# Patient Record
Sex: MALE | Race: WHITE | NOT HISPANIC OR LATINO | Employment: OTHER | URBAN - METROPOLITAN AREA
[De-identification: names, ages, dates, MRNs, and addresses within clinical notes are randomized per-mention and may not be internally consistent; named-entity substitution may affect disease eponyms.]

---

## 2017-01-01 ENCOUNTER — HOSPITAL ENCOUNTER (INPATIENT)
Facility: HOSPITAL | Age: 82
LOS: 5 days | End: 2017-07-24
Attending: FAMILY MEDICINE | Admitting: FAMILY MEDICINE

## 2017-01-01 ENCOUNTER — APPOINTMENT (OUTPATIENT)
Dept: GENERAL RADIOLOGY | Facility: HOSPITAL | Age: 82
End: 2017-01-01

## 2017-01-01 ENCOUNTER — LAB (OUTPATIENT)
Dept: LAB | Facility: HOSPITAL | Age: 82
End: 2017-01-01

## 2017-01-01 ENCOUNTER — HOSPITAL ENCOUNTER (INPATIENT)
Facility: HOSPITAL | Age: 82
LOS: 5 days | End: 2017-07-19
Attending: EMERGENCY MEDICINE | Admitting: HOSPITALIST

## 2017-01-01 ENCOUNTER — TRANSCRIBE ORDERS (OUTPATIENT)
Dept: LAB | Facility: HOSPITAL | Age: 82
End: 2017-01-01

## 2017-01-01 VITALS
DIASTOLIC BLOOD PRESSURE: 48 MMHG | RESPIRATION RATE: 18 BRPM | HEIGHT: 67 IN | TEMPERATURE: 97.6 F | BODY MASS INDEX: 19.62 KG/M2 | HEART RATE: 63 BPM | SYSTOLIC BLOOD PRESSURE: 100 MMHG | OXYGEN SATURATION: 96 % | WEIGHT: 125 LBS

## 2017-01-01 VITALS
BODY MASS INDEX: 19.62 KG/M2 | OXYGEN SATURATION: 93 % | TEMPERATURE: 97.1 F | HEIGHT: 67 IN | DIASTOLIC BLOOD PRESSURE: 61 MMHG | HEART RATE: 63 BPM | SYSTOLIC BLOOD PRESSURE: 91 MMHG | WEIGHT: 125 LBS | RESPIRATION RATE: 18 BRPM

## 2017-01-01 DIAGNOSIS — Z78.9 IMPAIRED MOBILITY AND ADLS: ICD-10-CM

## 2017-01-01 DIAGNOSIS — Z74.09 IMPAIRED FUNCTIONAL MOBILITY, BALANCE, GAIT, AND ENDURANCE: ICD-10-CM

## 2017-01-01 DIAGNOSIS — G06.1 INTRASPINAL ABSCESS: ICD-10-CM

## 2017-01-01 DIAGNOSIS — I95.9 HYPOTENSION, UNSPECIFIED HYPOTENSION TYPE: ICD-10-CM

## 2017-01-01 DIAGNOSIS — N39.0 ACUTE UTI: Primary | ICD-10-CM

## 2017-01-01 DIAGNOSIS — B95.2 ENTEROCOCCUS FAECALIS INFECTION: ICD-10-CM

## 2017-01-01 DIAGNOSIS — M46.36 PYOGENIC INFECTION OF LUMBAR INTERVERTEBRAL DISC (HCC): ICD-10-CM

## 2017-01-01 DIAGNOSIS — Z74.09 IMPAIRED MOBILITY AND ADLS: ICD-10-CM

## 2017-01-01 DIAGNOSIS — E87.20 LACTIC ACID INCREASED: ICD-10-CM

## 2017-01-01 DIAGNOSIS — I48.91 ATRIAL FIBRILLATION, UNSPECIFIED TYPE (HCC): ICD-10-CM

## 2017-01-01 DIAGNOSIS — F03.90 DEMENTIA WITHOUT BEHAVIORAL DISTURBANCE, UNSPECIFIED DEMENTIA TYPE: ICD-10-CM

## 2017-01-01 DIAGNOSIS — T84.63XD INFLAMMATORY REACTION DUE TO INTERNAL FIXATION DEVICE OF SPINE, SUBSEQUENT ENCOUNTER: Primary | ICD-10-CM

## 2017-01-01 DIAGNOSIS — R41.82 ALTERED MENTAL STATUS, UNSPECIFIED ALTERED MENTAL STATUS TYPE: ICD-10-CM

## 2017-01-01 DIAGNOSIS — T84.63XD INFLAMMATORY REACTION DUE TO INTERNAL FIXATION DEVICE OF SPINE, SUBSEQUENT ENCOUNTER: ICD-10-CM

## 2017-01-01 LAB
ABO + RH BLD: NORMAL
ABO + RH BLD: NORMAL
ABO GROUP BLD: NORMAL
ABO GROUP BLD: NORMAL
ALBUMIN SERPL-MCNC: 2.7 G/DL (ref 3.2–4.8)
ALBUMIN SERPL-MCNC: 3.5 G/DL (ref 3.2–4.8)
ALBUMIN/GLOB SERPL: 1.1 G/DL (ref 1.5–2.5)
ALBUMIN/GLOB SERPL: 1.2 G/DL (ref 1.5–2.5)
ALP SERPL-CCNC: 110 U/L (ref 25–100)
ALP SERPL-CCNC: 88 U/L (ref 25–100)
ALT SERPL W P-5'-P-CCNC: 11 U/L (ref 7–40)
ALT SERPL W P-5'-P-CCNC: 14 U/L (ref 7–40)
ANION GAP SERPL CALCULATED.3IONS-SCNC: 7 MMOL/L (ref 3–11)
ANION GAP SERPL CALCULATED.3IONS-SCNC: 7 MMOL/L (ref 3–11)
ANION GAP SERPL CALCULATED.3IONS-SCNC: 8 MMOL/L (ref 3–11)
ANION GAP SERPL CALCULATED.3IONS-SCNC: 8 MMOL/L (ref 3–11)
AST SERPL-CCNC: 15 U/L (ref 0–33)
AST SERPL-CCNC: 17 U/L (ref 0–33)
BACTERIA SPEC AEROBE CULT: ABNORMAL
BACTERIA SPEC AEROBE CULT: NORMAL
BACTERIA SPEC AEROBE CULT: NORMAL
BACTERIA UR QL AUTO: ABNORMAL /HPF
BASOPHILS # BLD AUTO: 0 10*3/MM3 (ref 0–0.2)
BASOPHILS # BLD AUTO: 0.01 10*3/MM3 (ref 0–0.2)
BASOPHILS NFR BLD AUTO: 0 % (ref 0–1)
BASOPHILS NFR BLD AUTO: 0.2 % (ref 0–1)
BASOPHILS NFR BLD AUTO: 0.3 % (ref 0–1)
BH BB BLOOD EXPIRATION DATE: NORMAL
BH BB BLOOD EXPIRATION DATE: NORMAL
BH BB BLOOD TYPE BARCODE: 7300
BH BB BLOOD TYPE BARCODE: 7300
BH BB DISPENSE STATUS: NORMAL
BH BB DISPENSE STATUS: NORMAL
BH BB PRODUCT CODE: NORMAL
BH BB PRODUCT CODE: NORMAL
BH BB UNIT NUMBER: NORMAL
BH BB UNIT NUMBER: NORMAL
BILIRUB SERPL-MCNC: 0.7 MG/DL (ref 0.3–1.2)
BILIRUB SERPL-MCNC: 0.9 MG/DL (ref 0.3–1.2)
BILIRUB UR QL STRIP: NEGATIVE
BLD GP AB SCN SERPL QL: NEGATIVE
BNP SERPL-MCNC: 323 PG/ML (ref 0–100)
BUN BLD-MCNC: 22 MG/DL (ref 9–23)
BUN BLD-MCNC: 27 MG/DL (ref 9–23)
BUN BLD-MCNC: 27 MG/DL (ref 9–23)
BUN BLD-MCNC: 33 MG/DL (ref 9–23)
BUN/CREAT SERPL: 22 (ref 7–25)
BUN/CREAT SERPL: 22.5 (ref 7–25)
BUN/CREAT SERPL: 24.5 (ref 7–25)
BUN/CREAT SERPL: 25.4 (ref 7–25)
BURR CELLS BLD QL SMEAR: NORMAL
C DIFF TOX GENS STL QL NAA+PROBE: NOT DETECTED
CALCIUM SPEC-SCNC: 10 MG/DL (ref 8.7–10.4)
CALCIUM SPEC-SCNC: 9 MG/DL (ref 8.7–10.4)
CALCIUM SPEC-SCNC: 9.3 MG/DL (ref 8.7–10.4)
CALCIUM SPEC-SCNC: 9.3 MG/DL (ref 8.7–10.4)
CHLORIDE SERPL-SCNC: 105 MMOL/L (ref 99–109)
CHLORIDE SERPL-SCNC: 106 MMOL/L (ref 99–109)
CHLORIDE SERPL-SCNC: 106 MMOL/L (ref 99–109)
CHLORIDE SERPL-SCNC: 109 MMOL/L (ref 99–109)
CK SERPL-CCNC: 22 U/L (ref 26–174)
CLARITY UR: ABNORMAL
CO2 SERPL-SCNC: 21 MMOL/L (ref 20–31)
CO2 SERPL-SCNC: 22 MMOL/L (ref 20–31)
CO2 SERPL-SCNC: 23 MMOL/L (ref 20–31)
CO2 SERPL-SCNC: 24 MMOL/L (ref 20–31)
COLOR UR: YELLOW
CREAT BLD-MCNC: 1 MG/DL (ref 0.6–1.3)
CREAT BLD-MCNC: 1.1 MG/DL (ref 0.6–1.3)
CREAT BLD-MCNC: 1.2 MG/DL (ref 0.6–1.3)
CREAT BLD-MCNC: 1.3 MG/DL (ref 0.6–1.3)
CROSSMATCH INTERPRETATION: NORMAL
CROSSMATCH INTERPRETATION: NORMAL
CRP SERPL-MCNC: 0.61 MG/DL (ref 0–1)
D-LACTATE SERPL-SCNC: 2.4 MMOL/L (ref 0.5–2)
D-LACTATE SERPL-SCNC: 3.5 MMOL/L (ref 0.5–2)
D-LACTATE SERPL-SCNC: 3.7 MMOL/L (ref 0.5–2)
D-LACTATE SERPL-SCNC: 5.1 MMOL/L (ref 0.5–2)
DEPRECATED RDW RBC AUTO: 54.2 FL (ref 37–54)
DEPRECATED RDW RBC AUTO: 57.8 FL (ref 37–54)
DEPRECATED RDW RBC AUTO: 57.9 FL (ref 37–54)
DEPRECATED RDW RBC AUTO: 58.1 FL (ref 37–54)
DEPRECATED RDW RBC AUTO: 58.4 FL (ref 37–54)
EOSINOPHIL # BLD AUTO: 0.02 10*3/MM3 (ref 0–0.3)
EOSINOPHIL # BLD AUTO: 0.05 10*3/MM3 (ref 0–0.3)
EOSINOPHIL # BLD AUTO: 0.06 10*3/MM3 (ref 0–0.3)
EOSINOPHIL # BLD AUTO: 0.07 10*3/MM3 (ref 0–0.3)
EOSINOPHIL # BLD AUTO: 0.09 10*3/MM3 (ref 0–0.3)
EOSINOPHIL NFR BLD AUTO: 0.5 % (ref 0–3)
EOSINOPHIL NFR BLD AUTO: 1.6 % (ref 0–3)
EOSINOPHIL NFR BLD AUTO: 1.7 % (ref 0–3)
EOSINOPHIL NFR BLD AUTO: 1.8 % (ref 0–3)
EOSINOPHIL NFR BLD AUTO: 3.1 % (ref 0–3)
ERYTHROCYTE [DISTWIDTH] IN BLOOD BY AUTOMATED COUNT: 17.4 % (ref 11.3–14.5)
ERYTHROCYTE [DISTWIDTH] IN BLOOD BY AUTOMATED COUNT: 18.6 % (ref 11.3–14.5)
ERYTHROCYTE [DISTWIDTH] IN BLOOD BY AUTOMATED COUNT: 18.7 % (ref 11.3–14.5)
ERYTHROCYTE [DISTWIDTH] IN BLOOD BY AUTOMATED COUNT: 18.8 % (ref 11.3–14.5)
ERYTHROCYTE [DISTWIDTH] IN BLOOD BY AUTOMATED COUNT: 18.8 % (ref 11.3–14.5)
GFR SERPL CREATININE-BSD FRML MDRD: 52 ML/MIN/1.73
GFR SERPL CREATININE-BSD FRML MDRD: 57 ML/MIN/1.73
GFR SERPL CREATININE-BSD FRML MDRD: 63 ML/MIN/1.73
GFR SERPL CREATININE-BSD FRML MDRD: 71 ML/MIN/1.73
GLOBULIN UR ELPH-MCNC: 2.5 GM/DL
GLOBULIN UR ELPH-MCNC: 3 GM/DL
GLUCOSE BLD-MCNC: 107 MG/DL (ref 70–100)
GLUCOSE BLD-MCNC: 76 MG/DL (ref 70–100)
GLUCOSE BLD-MCNC: 78 MG/DL (ref 70–100)
GLUCOSE BLD-MCNC: 80 MG/DL (ref 70–100)
GLUCOSE BLDC GLUCOMTR-MCNC: 69 MG/DL (ref 70–130)
GLUCOSE UR STRIP-MCNC: NEGATIVE MG/DL
HCT VFR BLD AUTO: 23.6 % (ref 38.9–50.9)
HCT VFR BLD AUTO: 25.3 % (ref 38.9–50.9)
HCT VFR BLD AUTO: 26.5 % (ref 38.9–50.9)
HCT VFR BLD AUTO: 29.2 % (ref 38.9–50.9)
HCT VFR BLD AUTO: 29.9 % (ref 38.9–50.9)
HGB BLD-MCNC: 7.4 G/DL (ref 13.1–17.5)
HGB BLD-MCNC: 7.9 G/DL (ref 13.1–17.5)
HGB BLD-MCNC: 8.2 G/DL (ref 13.1–17.5)
HGB BLD-MCNC: 9.2 G/DL (ref 13.1–17.5)
HGB BLD-MCNC: 9.8 G/DL (ref 13.1–17.5)
HGB UR QL STRIP.AUTO: ABNORMAL
HOLD SPECIMEN: NORMAL
HYALINE CASTS UR QL AUTO: ABNORMAL /LPF
IMM GRANULOCYTES # BLD: 0 10*3/MM3 (ref 0–0.03)
IMM GRANULOCYTES # BLD: 0.01 10*3/MM3 (ref 0–0.03)
IMM GRANULOCYTES # BLD: 0.01 10*3/MM3 (ref 0–0.03)
IMM GRANULOCYTES NFR BLD: 0 % (ref 0–0.6)
IMM GRANULOCYTES NFR BLD: 0.2 % (ref 0–0.6)
IMM GRANULOCYTES NFR BLD: 0.3 % (ref 0–0.6)
KETONES UR QL STRIP: NEGATIVE
LEUKOCYTE ESTERASE UR QL STRIP.AUTO: ABNORMAL
LYMPHOCYTES # BLD AUTO: 0.5 10*3/MM3 (ref 0.6–4.8)
LYMPHOCYTES # BLD AUTO: 0.72 10*3/MM3 (ref 0.6–4.8)
LYMPHOCYTES # BLD AUTO: 0.81 10*3/MM3 (ref 0.6–4.8)
LYMPHOCYTES # BLD AUTO: 0.95 10*3/MM3 (ref 0.6–4.8)
LYMPHOCYTES # BLD AUTO: 1.05 10*3/MM3 (ref 0.6–4.8)
LYMPHOCYTES NFR BLD AUTO: 16.2 % (ref 24–44)
LYMPHOCYTES NFR BLD AUTO: 17.9 % (ref 24–44)
LYMPHOCYTES NFR BLD AUTO: 22.8 % (ref 24–44)
LYMPHOCYTES NFR BLD AUTO: 28 % (ref 24–44)
LYMPHOCYTES NFR BLD AUTO: 31 % (ref 24–44)
MCH RBC QN AUTO: 26.6 PG (ref 27–31)
MCH RBC QN AUTO: 26.7 PG (ref 27–31)
MCH RBC QN AUTO: 26.8 PG (ref 27–31)
MCH RBC QN AUTO: 27.1 PG (ref 27–31)
MCH RBC QN AUTO: 28.1 PG (ref 27–31)
MCHC RBC AUTO-ENTMCNC: 30.9 G/DL (ref 32–36)
MCHC RBC AUTO-ENTMCNC: 31.2 G/DL (ref 32–36)
MCHC RBC AUTO-ENTMCNC: 31.4 G/DL (ref 32–36)
MCHC RBC AUTO-ENTMCNC: 31.5 G/DL (ref 32–36)
MCHC RBC AUTO-ENTMCNC: 32.8 G/DL (ref 32–36)
MCV RBC AUTO: 85.2 FL (ref 80–99)
MCV RBC AUTO: 85.7 FL (ref 80–99)
MCV RBC AUTO: 85.8 FL (ref 80–99)
MCV RBC AUTO: 85.9 FL (ref 80–99)
MCV RBC AUTO: 86 FL (ref 80–99)
MONOCYTES # BLD AUTO: 0.15 10*3/MM3 (ref 0–1)
MONOCYTES # BLD AUTO: 0.25 10*3/MM3 (ref 0–1)
MONOCYTES # BLD AUTO: 0.28 10*3/MM3 (ref 0–1)
MONOCYTES # BLD AUTO: 0.29 10*3/MM3 (ref 0–1)
MONOCYTES # BLD AUTO: 0.5 10*3/MM3 (ref 0–1)
MONOCYTES NFR BLD AUTO: 10 % (ref 0–12)
MONOCYTES NFR BLD AUTO: 12 % (ref 0–12)
MONOCYTES NFR BLD AUTO: 4.9 % (ref 0–12)
MONOCYTES NFR BLD AUTO: 6.9 % (ref 0–12)
MONOCYTES NFR BLD AUTO: 7.4 % (ref 0–12)
NEUTROPHILS # BLD AUTO: 1.69 10*3/MM3 (ref 1.5–8.3)
NEUTROPHILS # BLD AUTO: 2.02 10*3/MM3 (ref 1.5–8.3)
NEUTROPHILS # BLD AUTO: 2.37 10*3/MM3 (ref 1.5–8.3)
NEUTROPHILS # BLD AUTO: 2.64 10*3/MM3 (ref 1.5–8.3)
NEUTROPHILS # BLD AUTO: 3 10*3/MM3 (ref 1.5–8.3)
NEUTROPHILS NFR BLD AUTO: 58.6 % (ref 41–71)
NEUTROPHILS NFR BLD AUTO: 59.5 % (ref 41–71)
NEUTROPHILS NFR BLD AUTO: 63.3 % (ref 41–71)
NEUTROPHILS NFR BLD AUTO: 74.5 % (ref 41–71)
NEUTROPHILS NFR BLD AUTO: 76.7 % (ref 41–71)
NITRITE UR QL STRIP: NEGATIVE
NRBC BLD MANUAL-RTO: 0 /100 WBC (ref 0–0)
NRBC BLD MANUAL-RTO: 0 /100 WBC (ref 0–0)
OVALOCYTES BLD QL SMEAR: NORMAL
OVALOCYTES BLD QL SMEAR: NORMAL
PH UR STRIP.AUTO: 5.5 [PH] (ref 5–8)
PLAT MORPH BLD: NORMAL
PLATELET # BLD AUTO: 33 10*3/MM3 (ref 150–450)
PLATELET # BLD AUTO: 33 10*3/MM3 (ref 150–450)
PLATELET # BLD AUTO: 36 10*3/MM3 (ref 150–450)
PLATELET # BLD AUTO: 38 10*3/MM3 (ref 150–450)
PLATELET # BLD AUTO: 54 10*3/MM3 (ref 150–450)
PMV BLD AUTO: 10.3 FL (ref 6–12)
PMV BLD AUTO: 10.5 FL (ref 6–12)
PMV BLD AUTO: 10.6 FL (ref 6–12)
PMV BLD AUTO: 11.1 FL (ref 6–12)
POTASSIUM BLD-SCNC: 3.8 MMOL/L (ref 3.5–5.5)
POTASSIUM BLD-SCNC: 4 MMOL/L (ref 3.5–5.5)
POTASSIUM BLD-SCNC: 4.1 MMOL/L (ref 3.5–5.5)
POTASSIUM BLD-SCNC: 4.2 MMOL/L (ref 3.5–5.5)
PROT SERPL-MCNC: 5.2 G/DL (ref 5.7–8.2)
PROT SERPL-MCNC: 6.5 G/DL (ref 5.7–8.2)
PROT UR QL STRIP: ABNORMAL
RBC # BLD AUTO: 2.77 10*6/MM3 (ref 4.2–5.76)
RBC # BLD AUTO: 2.95 10*6/MM3 (ref 4.2–5.76)
RBC # BLD AUTO: 3.08 10*6/MM3 (ref 4.2–5.76)
RBC # BLD AUTO: 3.4 10*6/MM3 (ref 4.2–5.76)
RBC # BLD AUTO: 3.49 10*6/MM3 (ref 4.2–5.76)
RBC # UR: ABNORMAL /HPF
RBC MORPH BLD: NORMAL
REF LAB TEST METHOD: ABNORMAL
RH BLD: POSITIVE
RH BLD: POSITIVE
SODIUM BLD-SCNC: 135 MMOL/L (ref 132–146)
SODIUM BLD-SCNC: 136 MMOL/L (ref 132–146)
SODIUM BLD-SCNC: 137 MMOL/L (ref 132–146)
SODIUM BLD-SCNC: 138 MMOL/L (ref 132–146)
SP GR UR STRIP: 1.01 (ref 1–1.03)
SQUAMOUS #/AREA URNS HPF: ABNORMAL /HPF
TROPONIN I SERPL-MCNC: 0.01 NG/ML (ref 0–0.07)
UNIT  ABO: NORMAL
UNIT  ABO: NORMAL
UNIT  RH: NORMAL
UNIT  RH: NORMAL
UROBILINOGEN UR QL STRIP: ABNORMAL
WBC MORPH BLD: NORMAL
WBC NRBC COR # BLD: 2.89 10*3/MM3 (ref 3.5–10.8)
WBC NRBC COR # BLD: 3.09 10*3/MM3 (ref 3.5–10.8)
WBC NRBC COR # BLD: 3.39 10*3/MM3 (ref 3.5–10.8)
WBC NRBC COR # BLD: 4.03 10*3/MM3 (ref 3.5–10.8)
WBC NRBC COR # BLD: 4.17 10*3/MM3 (ref 3.5–10.8)
WBC UR QL AUTO: ABNORMAL /HPF
WHOLE BLOOD HOLD SPECIMEN: NORMAL
WHOLE BLOOD HOLD SPECIMEN: NORMAL

## 2017-01-01 PROCEDURE — 25010000002 DAPTOMYCIN PER 1 MG: Performed by: INTERNAL MEDICINE

## 2017-01-01 PROCEDURE — 87077 CULTURE AEROBIC IDENTIFY: CPT | Performed by: EMERGENCY MEDICINE

## 2017-01-01 PROCEDURE — 25010000002 HALOPERIDOL LACTATE PER 5 MG: Performed by: NURSE PRACTITIONER

## 2017-01-01 PROCEDURE — 86850 RBC ANTIBODY SCREEN: CPT | Performed by: INTERNAL MEDICINE

## 2017-01-01 PROCEDURE — 87186 SC STD MICRODIL/AGAR DIL: CPT | Performed by: EMERGENCY MEDICINE

## 2017-01-01 PROCEDURE — 02HV33Z INSERTION OF INFUSION DEVICE INTO SUPERIOR VENA CAVA, PERCUTANEOUS APPROACH: ICD-10-PCS | Performed by: INTERNAL MEDICINE

## 2017-01-01 PROCEDURE — C1894 INTRO/SHEATH, NON-LASER: HCPCS

## 2017-01-01 PROCEDURE — P9016 RBC LEUKOCYTES REDUCED: HCPCS

## 2017-01-01 PROCEDURE — 25010000002 MORPHINE SULFATE (PF) 2 MG/ML SOLUTION: Performed by: NURSE PRACTITIONER

## 2017-01-01 PROCEDURE — 25010000002 HALOPERIDOL LACTATE PER 5 MG: Performed by: FAMILY MEDICINE

## 2017-01-01 PROCEDURE — 71010 HC CHEST PA OR AP: CPT

## 2017-01-01 PROCEDURE — 86900 BLOOD TYPING SEROLOGIC ABO: CPT | Performed by: INTERNAL MEDICINE

## 2017-01-01 PROCEDURE — 99284 EMERGENCY DEPT VISIT MOD MDM: CPT

## 2017-01-01 PROCEDURE — C1751 CATH, INF, PER/CENT/MIDLINE: HCPCS

## 2017-01-01 PROCEDURE — 99233 SBSQ HOSP IP/OBS HIGH 50: CPT | Performed by: INTERNAL MEDICINE

## 2017-01-01 PROCEDURE — 85007 BL SMEAR W/DIFF WBC COUNT: CPT | Performed by: NURSE PRACTITIONER

## 2017-01-01 PROCEDURE — 25010000002 LORAZEPAM PER 2 MG: Performed by: NURSE PRACTITIONER

## 2017-01-01 PROCEDURE — 83880 ASSAY OF NATRIURETIC PEPTIDE: CPT | Performed by: EMERGENCY MEDICINE

## 2017-01-01 PROCEDURE — 25010000002 ENOXAPARIN PER 10 MG: Performed by: NURSE PRACTITIONER

## 2017-01-01 PROCEDURE — 80053 COMPREHEN METABOLIC PANEL: CPT | Performed by: NURSE PRACTITIONER

## 2017-01-01 PROCEDURE — 85025 COMPLETE CBC W/AUTO DIFF WBC: CPT | Performed by: NURSE PRACTITIONER

## 2017-01-01 PROCEDURE — 97530 THERAPEUTIC ACTIVITIES: CPT

## 2017-01-01 PROCEDURE — 83605 ASSAY OF LACTIC ACID: CPT | Performed by: NURSE PRACTITIONER

## 2017-01-01 PROCEDURE — 97110 THERAPEUTIC EXERCISES: CPT

## 2017-01-01 PROCEDURE — 82962 GLUCOSE BLOOD TEST: CPT

## 2017-01-01 PROCEDURE — 86920 COMPATIBILITY TEST SPIN: CPT

## 2017-01-01 PROCEDURE — 81001 URINALYSIS AUTO W/SCOPE: CPT | Performed by: EMERGENCY MEDICINE

## 2017-01-01 PROCEDURE — 80053 COMPREHEN METABOLIC PANEL: CPT | Performed by: EMERGENCY MEDICINE

## 2017-01-01 PROCEDURE — 85007 BL SMEAR W/DIFF WBC COUNT: CPT | Performed by: INTERNAL MEDICINE

## 2017-01-01 PROCEDURE — 85025 COMPLETE CBC W/AUTO DIFF WBC: CPT | Performed by: EMERGENCY MEDICINE

## 2017-01-01 PROCEDURE — 82550 ASSAY OF CK (CPK): CPT | Performed by: INTERNAL MEDICINE

## 2017-01-01 PROCEDURE — 25010000002 MEROPENEM: Performed by: NURSE PRACTITIONER

## 2017-01-01 PROCEDURE — 25010000002 MEROPENEM: Performed by: EMERGENCY MEDICINE

## 2017-01-01 PROCEDURE — 86900 BLOOD TYPING SEROLOGIC ABO: CPT

## 2017-01-01 PROCEDURE — 36430 TRANSFUSION BLD/BLD COMPNT: CPT

## 2017-01-01 PROCEDURE — 99238 HOSP IP/OBS DSCHRG MGMT 30/<: CPT | Performed by: HOSPITALIST

## 2017-01-01 PROCEDURE — 87493 C DIFF AMPLIFIED PROBE: CPT | Performed by: NURSE PRACTITIONER

## 2017-01-01 PROCEDURE — 97162 PT EVAL MOD COMPLEX 30 MIN: CPT

## 2017-01-01 PROCEDURE — 93005 ELECTROCARDIOGRAM TRACING: CPT | Performed by: EMERGENCY MEDICINE

## 2017-01-01 PROCEDURE — 99232 SBSQ HOSP IP/OBS MODERATE 35: CPT | Performed by: NURSE PRACTITIONER

## 2017-01-01 PROCEDURE — 86901 BLOOD TYPING SEROLOGIC RH(D): CPT

## 2017-01-01 PROCEDURE — 99223 1ST HOSP IP/OBS HIGH 75: CPT | Performed by: INTERNAL MEDICINE

## 2017-01-01 PROCEDURE — 87040 BLOOD CULTURE FOR BACTERIA: CPT | Performed by: EMERGENCY MEDICINE

## 2017-01-01 PROCEDURE — 80048 BASIC METABOLIC PNL TOTAL CA: CPT | Performed by: INTERNAL MEDICINE

## 2017-01-01 PROCEDURE — 85025 COMPLETE CBC W/AUTO DIFF WBC: CPT | Performed by: INTERNAL MEDICINE

## 2017-01-01 PROCEDURE — 97166 OT EVAL MOD COMPLEX 45 MIN: CPT

## 2017-01-01 PROCEDURE — 99232 SBSQ HOSP IP/OBS MODERATE 35: CPT | Performed by: INTERNAL MEDICINE

## 2017-01-01 PROCEDURE — 84484 ASSAY OF TROPONIN QUANT: CPT

## 2017-01-01 PROCEDURE — 86140 C-REACTIVE PROTEIN: CPT | Performed by: EMERGENCY MEDICINE

## 2017-01-01 PROCEDURE — 86901 BLOOD TYPING SEROLOGIC RH(D): CPT | Performed by: INTERNAL MEDICINE

## 2017-01-01 PROCEDURE — 83605 ASSAY OF LACTIC ACID: CPT | Performed by: EMERGENCY MEDICINE

## 2017-01-01 PROCEDURE — 87086 URINE CULTURE/COLONY COUNT: CPT | Performed by: EMERGENCY MEDICINE

## 2017-01-01 RX ORDER — LORAZEPAM 2 MG/ML
0.5 INJECTION INTRAMUSCULAR EVERY 4 HOURS PRN
Status: DISCONTINUED | OUTPATIENT
Start: 2017-01-01 | End: 2017-01-01 | Stop reason: HOSPADM

## 2017-01-01 RX ORDER — FINASTERIDE 5 MG/1
5 TABLET, FILM COATED ORAL DAILY
Status: DISCONTINUED | OUTPATIENT
Start: 2017-01-01 | End: 2017-01-01 | Stop reason: HOSPADM

## 2017-01-01 RX ORDER — GLYCOPYRROLATE 0.2 MG/ML
0.2 INJECTION INTRAMUSCULAR; INTRAVENOUS
Status: DISCONTINUED | OUTPATIENT
Start: 2017-01-01 | End: 2017-01-01 | Stop reason: HOSPADM

## 2017-01-01 RX ORDER — MORPHINE SULFATE 2 MG/ML
2 INJECTION, SOLUTION INTRAMUSCULAR; INTRAVENOUS
Status: DISCONTINUED | OUTPATIENT
Start: 2017-01-01 | End: 2017-01-01 | Stop reason: HOSPADM

## 2017-01-01 RX ORDER — HALOPERIDOL 5 MG/ML
0.5 INJECTION INTRAMUSCULAR EVERY 6 HOURS
Status: DISCONTINUED | OUTPATIENT
Start: 2017-01-01 | End: 2017-01-01

## 2017-01-01 RX ORDER — FINASTERIDE 5 MG/1
5 TABLET, FILM COATED ORAL DAILY
COMMUNITY

## 2017-01-01 RX ORDER — DONEPEZIL HYDROCHLORIDE 10 MG/1
5 TABLET, FILM COATED ORAL NIGHTLY
Status: DISCONTINUED | OUTPATIENT
Start: 2017-01-01 | End: 2017-01-01

## 2017-01-01 RX ORDER — SACCHAROMYCES BOULARDII 250 MG
250 CAPSULE ORAL 2 TIMES DAILY
Status: DISCONTINUED | OUTPATIENT
Start: 2017-01-01 | End: 2017-01-01

## 2017-01-01 RX ORDER — GLYCOPYRROLATE 0.2 MG/ML
0.2 INJECTION INTRAMUSCULAR; INTRAVENOUS 2 TIMES DAILY PRN
Status: DISCONTINUED | OUTPATIENT
Start: 2017-01-01 | End: 2017-01-01

## 2017-01-01 RX ORDER — NIFEDIPINE 60 MG/1
60 TABLET, EXTENDED RELEASE ORAL DAILY
COMMUNITY

## 2017-01-01 RX ORDER — DONEPEZIL HYDROCHLORIDE 5 MG/1
5 TABLET, FILM COATED ORAL NIGHTLY
COMMUNITY

## 2017-01-01 RX ORDER — FAMOTIDINE 20 MG/1
20 TABLET, FILM COATED ORAL DAILY
COMMUNITY

## 2017-01-01 RX ORDER — HYDROCODONE BITARTRATE AND ACETAMINOPHEN 5; 325 MG/1; MG/1
1 TABLET ORAL EVERY 8 HOURS PRN
Status: DISCONTINUED | OUTPATIENT
Start: 2017-01-01 | End: 2017-01-01

## 2017-01-01 RX ORDER — HALOPERIDOL 5 MG/ML
2 INJECTION INTRAMUSCULAR EVERY 4 HOURS PRN
Status: DISCONTINUED | OUTPATIENT
Start: 2017-01-01 | End: 2017-01-01 | Stop reason: HOSPADM

## 2017-01-01 RX ORDER — SACCHAROMYCES BOULARDII 250 MG
250 CAPSULE ORAL 2 TIMES DAILY
COMMUNITY

## 2017-01-01 RX ORDER — ASPIRIN 81 MG/1
81 TABLET, CHEWABLE ORAL DAILY
COMMUNITY

## 2017-01-01 RX ORDER — MORPHINE SULFATE 2 MG/ML
1 INJECTION, SOLUTION INTRAMUSCULAR; INTRAVENOUS EVERY 6 HOURS SCHEDULED
Status: DISCONTINUED | OUTPATIENT
Start: 2017-01-01 | End: 2017-01-01 | Stop reason: HOSPADM

## 2017-01-01 RX ORDER — TEMAZEPAM 7.5 MG/1
7.5 CAPSULE ORAL NIGHTLY PRN
Status: DISCONTINUED | OUTPATIENT
Start: 2017-01-01 | End: 2017-01-01 | Stop reason: HOSPADM

## 2017-01-01 RX ORDER — SODIUM CHLORIDE 0.9 % (FLUSH) 0.9 %
1-10 SYRINGE (ML) INJECTION AS NEEDED
Status: DISCONTINUED | OUTPATIENT
Start: 2017-01-01 | End: 2017-01-01 | Stop reason: HOSPADM

## 2017-01-01 RX ORDER — HALOPERIDOL 5 MG/ML
1 INJECTION INTRAMUSCULAR EVERY 6 HOURS
Status: DISCONTINUED | OUTPATIENT
Start: 2017-01-01 | End: 2017-01-01 | Stop reason: HOSPADM

## 2017-01-01 RX ORDER — FAMOTIDINE 20 MG/1
20 TABLET, FILM COATED ORAL DAILY
Status: DISCONTINUED | OUTPATIENT
Start: 2017-01-01 | End: 2017-01-01

## 2017-01-01 RX ORDER — POLYVINYL ALCOHOL 14 MG/ML
2 SOLUTION/ DROPS OPHTHALMIC 2 TIMES DAILY
Status: DISCONTINUED | OUTPATIENT
Start: 2017-01-01 | End: 2017-01-01

## 2017-01-01 RX ORDER — DOCUSATE SODIUM 100 MG/1
100 CAPSULE, LIQUID FILLED ORAL 2 TIMES DAILY
Status: DISCONTINUED | OUTPATIENT
Start: 2017-01-01 | End: 2017-01-01

## 2017-01-01 RX ORDER — SODIUM CHLORIDE 0.9 % (FLUSH) 0.9 %
10-20 SYRINGE (ML) INJECTION AS NEEDED
Status: DISCONTINUED | OUTPATIENT
Start: 2017-01-01 | End: 2017-01-01 | Stop reason: HOSPADM

## 2017-01-01 RX ORDER — TAMSULOSIN HYDROCHLORIDE 0.4 MG/1
0.4 CAPSULE ORAL DAILY
Status: DISCONTINUED | OUTPATIENT
Start: 2017-01-01 | End: 2017-01-01 | Stop reason: HOSPADM

## 2017-01-01 RX ORDER — SODIUM CHLORIDE 9 MG/ML
125 INJECTION, SOLUTION INTRAVENOUS CONTINUOUS
Status: DISCONTINUED | OUTPATIENT
Start: 2017-01-01 | End: 2017-01-01

## 2017-01-01 RX ORDER — ACETAMINOPHEN 325 MG/1
650 TABLET ORAL EVERY 4 HOURS PRN
Status: DISCONTINUED | OUTPATIENT
Start: 2017-01-01 | End: 2017-01-01 | Stop reason: HOSPADM

## 2017-01-01 RX ORDER — MORPHINE SULFATE 2 MG/ML
1 INJECTION, SOLUTION INTRAMUSCULAR; INTRAVENOUS EVERY 6 HOURS
Status: DISCONTINUED | OUTPATIENT
Start: 2017-01-01 | End: 2017-01-01 | Stop reason: HOSPADM

## 2017-01-01 RX ORDER — SODIUM CHLORIDE 9 MG/ML
75 INJECTION, SOLUTION INTRAVENOUS CONTINUOUS
Status: DISCONTINUED | OUTPATIENT
Start: 2017-01-01 | End: 2017-01-01

## 2017-01-01 RX ORDER — ACETAMINOPHEN 325 MG/1
650 TABLET ORAL EVERY 4 HOURS PRN
COMMUNITY

## 2017-01-01 RX ORDER — SODIUM CHLORIDE 9 MG/ML
100 INJECTION, SOLUTION INTRAVENOUS CONTINUOUS
Status: DISCONTINUED | OUTPATIENT
Start: 2017-01-01 | End: 2017-01-01

## 2017-01-01 RX ORDER — HYDROCODONE BITARTRATE AND ACETAMINOPHEN 5; 325 MG/1; MG/1
1 TABLET ORAL EVERY 8 HOURS PRN
COMMUNITY

## 2017-01-01 RX ORDER — POLYVINYL ALCOHOL 14 MG/ML
2 SOLUTION/ DROPS OPHTHALMIC 2 TIMES DAILY PRN
Status: DISCONTINUED | OUTPATIENT
Start: 2017-01-01 | End: 2017-01-01 | Stop reason: HOSPADM

## 2017-01-01 RX ORDER — DOCUSATE SODIUM 100 MG/1
100 CAPSULE, LIQUID FILLED ORAL 2 TIMES DAILY
COMMUNITY

## 2017-01-01 RX ORDER — GLYCOPYRROLATE 0.2 MG/ML
0.4 INJECTION INTRAMUSCULAR; INTRAVENOUS EVERY 4 HOURS PRN
Status: DISCONTINUED | OUTPATIENT
Start: 2017-01-01 | End: 2017-01-01 | Stop reason: HOSPADM

## 2017-01-01 RX ORDER — LEVOTHYROXINE SODIUM 0.07 MG/1
75 TABLET ORAL EVERY MORNING
COMMUNITY

## 2017-01-01 RX ORDER — TEMAZEPAM 7.5 MG/1
7.5 CAPSULE ORAL NIGHTLY
COMMUNITY

## 2017-01-01 RX ORDER — ONDANSETRON 2 MG/ML
4 INJECTION INTRAMUSCULAR; INTRAVENOUS EVERY 6 HOURS PRN
Status: DISCONTINUED | OUTPATIENT
Start: 2017-01-01 | End: 2017-01-01 | Stop reason: HOSPADM

## 2017-01-01 RX ORDER — HYDROCODONE BITARTRATE AND ACETAMINOPHEN 5; 325 MG/1; MG/1
1 TABLET ORAL EVERY 4 HOURS PRN
Status: DISCONTINUED | OUTPATIENT
Start: 2017-01-01 | End: 2017-01-01 | Stop reason: HOSPADM

## 2017-01-01 RX ORDER — LORAZEPAM 2 MG/ML
0.25 INJECTION INTRAMUSCULAR 2 TIMES DAILY
Status: DISCONTINUED | OUTPATIENT
Start: 2017-01-01 | End: 2017-01-01 | Stop reason: HOSPADM

## 2017-01-01 RX ORDER — ASPIRIN 81 MG/1
81 TABLET, CHEWABLE ORAL DAILY
Status: DISCONTINUED | OUTPATIENT
Start: 2017-01-01 | End: 2017-01-01

## 2017-01-01 RX ORDER — LORAZEPAM 2 MG/ML
0.25 INJECTION INTRAMUSCULAR ONCE
Status: COMPLETED | OUTPATIENT
Start: 2017-01-01 | End: 2017-01-01

## 2017-01-01 RX ORDER — HALOPERIDOL 5 MG/ML
0.5 INJECTION INTRAMUSCULAR EVERY 6 HOURS
Status: DISCONTINUED | OUTPATIENT
Start: 2017-01-01 | End: 2017-01-01 | Stop reason: HOSPADM

## 2017-01-01 RX ORDER — LEVOTHYROXINE SODIUM 0.07 MG/1
75 TABLET ORAL EVERY MORNING
Status: DISCONTINUED | OUTPATIENT
Start: 2017-01-01 | End: 2017-01-01

## 2017-01-01 RX ORDER — BISACODYL 10 MG
10 SUPPOSITORY, RECTAL RECTAL DAILY
Status: DISPENSED | OUTPATIENT
Start: 2017-01-01 | End: 2017-01-01

## 2017-01-01 RX ORDER — TAMSULOSIN HYDROCHLORIDE 0.4 MG/1
0.4 CAPSULE ORAL DAILY
COMMUNITY

## 2017-01-01 RX ORDER — LINEZOLID 600 MG/1
600 TABLET, FILM COATED ORAL 2 TIMES DAILY
COMMUNITY
End: 2017-01-01

## 2017-01-01 RX ORDER — POLYETHYLENE GLYCOL 3350 17 G/17G
17 POWDER, FOR SOLUTION ORAL 2 TIMES DAILY
Status: DISCONTINUED | OUTPATIENT
Start: 2017-01-01 | End: 2017-01-01

## 2017-01-01 RX ORDER — POLYETHYLENE GLYCOL 3350 17 G/17G
17 POWDER, FOR SOLUTION ORAL
COMMUNITY

## 2017-01-01 RX ORDER — BISACODYL 10 MG
10 SUPPOSITORY, RECTAL RECTAL DAILY
Status: DISCONTINUED | OUTPATIENT
Start: 2017-01-01 | End: 2017-01-01 | Stop reason: HOSPADM

## 2017-01-01 RX ORDER — LINEZOLID 600 MG/1
600 TABLET, FILM COATED ORAL EVERY 12 HOURS
Status: DISCONTINUED | OUTPATIENT
Start: 2017-01-01 | End: 2017-01-01

## 2017-01-01 RX ADMIN — LINEZOLID 600 MG: 600 TABLET, FILM COATED ORAL at 06:13

## 2017-01-01 RX ADMIN — DONEPEZIL HYDROCHLORIDE 5 MG: 10 TABLET, FILM COATED ORAL at 20:48

## 2017-01-01 RX ADMIN — Medication: at 17:13

## 2017-01-01 RX ADMIN — Medication 250 MG: at 18:41

## 2017-01-01 RX ADMIN — MEROPENEM 1 G: 1 INJECTION, POWDER, FOR SOLUTION INTRAVENOUS at 05:44

## 2017-01-01 RX ADMIN — HALOPERIDOL LACTATE 1 MG: 5 INJECTION, SOLUTION INTRAMUSCULAR at 20:13

## 2017-01-01 RX ADMIN — SODIUM CHLORIDE 125 ML/HR: 9 INJECTION, SOLUTION INTRAVENOUS at 20:38

## 2017-01-01 RX ADMIN — MORPHINE SULFATE 1 MG: 2 INJECTION, SOLUTION INTRAMUSCULAR; INTRAVENOUS at 01:11

## 2017-01-01 RX ADMIN — LORAZEPAM 0.5 MG: 2 INJECTION INTRAMUSCULAR; INTRAVENOUS at 11:59

## 2017-01-01 RX ADMIN — LEVOTHYROXINE SODIUM 75 MCG: 75 TABLET ORAL at 08:51

## 2017-01-01 RX ADMIN — HALOPERIDOL LACTATE 1 MG: 5 INJECTION, SOLUTION INTRAMUSCULAR at 13:16

## 2017-01-01 RX ADMIN — ASPIRIN 81 MG CHEWABLE TABLET 81 MG: 81 TABLET CHEWABLE at 10:10

## 2017-01-01 RX ADMIN — LEVOTHYROXINE SODIUM 75 MCG: 75 TABLET ORAL at 09:11

## 2017-01-01 RX ADMIN — MORPHINE SULFATE 1 MG: 2 INJECTION, SOLUTION INTRAMUSCULAR; INTRAVENOUS at 17:03

## 2017-01-01 RX ADMIN — DONEPEZIL HYDROCHLORIDE 5 MG: 10 TABLET, FILM COATED ORAL at 21:03

## 2017-01-01 RX ADMIN — DAPTOMYCIN 350 MG: 500 INJECTION, POWDER, LYOPHILIZED, FOR SOLUTION INTRAVENOUS at 14:52

## 2017-01-01 RX ADMIN — HALOPERIDOL LACTATE 1 MG: 5 INJECTION, SOLUTION INTRAMUSCULAR at 21:36

## 2017-01-01 RX ADMIN — FAMOTIDINE 20 MG: 20 TABLET ORAL at 09:14

## 2017-01-01 RX ADMIN — LEVOTHYROXINE SODIUM 75 MCG: 75 TABLET ORAL at 08:55

## 2017-01-01 RX ADMIN — Medication 250 MG: at 08:51

## 2017-01-01 RX ADMIN — FINASTERIDE 5 MG: 5 TABLET, FILM COATED ORAL at 08:52

## 2017-01-01 RX ADMIN — Medication: at 09:51

## 2017-01-01 RX ADMIN — MORPHINE SULFATE 1 MG: 2 INJECTION, SOLUTION INTRAMUSCULAR; INTRAVENOUS at 17:13

## 2017-01-01 RX ADMIN — LORAZEPAM 0.5 MG: 2 INJECTION INTRAMUSCULAR; INTRAVENOUS at 21:47

## 2017-01-01 RX ADMIN — LORAZEPAM 0.25 MG: 2 INJECTION INTRAMUSCULAR; INTRAVENOUS at 12:25

## 2017-01-01 RX ADMIN — LORAZEPAM 0.25 MG: 2 INJECTION INTRAMUSCULAR; INTRAVENOUS at 22:07

## 2017-01-01 RX ADMIN — Medication 250 MG: at 18:48

## 2017-01-01 RX ADMIN — MORPHINE SULFATE 1 MG: 2 INJECTION, SOLUTION INTRAMUSCULAR; INTRAVENOUS at 00:51

## 2017-01-01 RX ADMIN — HALOPERIDOL LACTATE 0.5 MG: 5 INJECTION, SOLUTION INTRAMUSCULAR at 14:22

## 2017-01-01 RX ADMIN — MORPHINE SULFATE 1 MG: 2 INJECTION, SOLUTION INTRAMUSCULAR; INTRAVENOUS at 11:28

## 2017-01-01 RX ADMIN — HALOPERIDOL LACTATE 1 MG: 5 INJECTION, SOLUTION INTRAMUSCULAR at 20:14

## 2017-01-01 RX ADMIN — BISACODYL 10 MG: 10 SUPPOSITORY RECTAL at 17:04

## 2017-01-01 RX ADMIN — LORAZEPAM 0.25 MG: 2 INJECTION INTRAMUSCULAR; INTRAVENOUS at 21:37

## 2017-01-01 RX ADMIN — ENOXAPARIN SODIUM 40 MG: 40 INJECTION SUBCUTANEOUS at 09:10

## 2017-01-01 RX ADMIN — ASPIRIN 81 MG CHEWABLE TABLET 81 MG: 81 TABLET CHEWABLE at 08:56

## 2017-01-01 RX ADMIN — MORPHINE SULFATE 1 MG: 2 INJECTION, SOLUTION INTRAMUSCULAR; INTRAVENOUS at 23:55

## 2017-01-01 RX ADMIN — POLYETHYLENE GLYCOL 3350 17 G: 17 POWDER, FOR SOLUTION ORAL at 18:41

## 2017-01-01 RX ADMIN — MORPHINE SULFATE 1 MG: 2 INJECTION, SOLUTION INTRAMUSCULAR; INTRAVENOUS at 05:19

## 2017-01-01 RX ADMIN — TAMSULOSIN HYDROCHLORIDE 0.4 MG: 0.4 CAPSULE ORAL at 08:48

## 2017-01-01 RX ADMIN — LORAZEPAM 0.25 MG: 2 INJECTION INTRAMUSCULAR; INTRAVENOUS at 08:56

## 2017-01-01 RX ADMIN — MORPHINE SULFATE 1 MG: 2 INJECTION, SOLUTION INTRAMUSCULAR; INTRAVENOUS at 06:37

## 2017-01-01 RX ADMIN — DAPTOMYCIN 350 MG: 500 INJECTION, POWDER, LYOPHILIZED, FOR SOLUTION INTRAVENOUS at 15:44

## 2017-01-01 RX ADMIN — HALOPERIDOL LACTATE 1 MG: 5 INJECTION, SOLUTION INTRAMUSCULAR at 21:04

## 2017-01-01 RX ADMIN — MORPHINE SULFATE 1 MG: 2 INJECTION, SOLUTION INTRAMUSCULAR; INTRAVENOUS at 01:37

## 2017-01-01 RX ADMIN — HALOPERIDOL LACTATE 1 MG: 5 INJECTION, SOLUTION INTRAMUSCULAR at 03:06

## 2017-01-01 RX ADMIN — HALOPERIDOL LACTATE 1 MG: 5 INJECTION, SOLUTION INTRAMUSCULAR at 08:43

## 2017-01-01 RX ADMIN — DAPTOMYCIN 350 MG: 500 INJECTION, POWDER, LYOPHILIZED, FOR SOLUTION INTRAVENOUS at 16:23

## 2017-01-01 RX ADMIN — SODIUM CHLORIDE 1701 ML: 9 INJECTION, SOLUTION INTRAVENOUS at 17:15

## 2017-01-01 RX ADMIN — MORPHINE SULFATE 1 MG: 2 INJECTION, SOLUTION INTRAMUSCULAR; INTRAVENOUS at 17:40

## 2017-01-01 RX ADMIN — HALOPERIDOL LACTATE 1 MG: 5 INJECTION, SOLUTION INTRAMUSCULAR at 08:31

## 2017-01-01 RX ADMIN — HALOPERIDOL LACTATE 1 MG: 5 INJECTION, SOLUTION INTRAMUSCULAR at 01:53

## 2017-01-01 RX ADMIN — MICONAZOLE NITRATE: 2 CREAM TOPICAL at 21:28

## 2017-01-01 RX ADMIN — Medication 250 MG: at 17:11

## 2017-01-01 RX ADMIN — MORPHINE SULFATE 1 MG: 2 INJECTION, SOLUTION INTRAMUSCULAR; INTRAVENOUS at 12:24

## 2017-01-01 RX ADMIN — ERTAPENEM SODIUM 1 G: 1 INJECTION, POWDER, LYOPHILIZED, FOR SOLUTION INTRAMUSCULAR; INTRAVENOUS at 18:47

## 2017-01-01 RX ADMIN — LINEZOLID 600 MG: 600 TABLET, FILM COATED ORAL at 18:41

## 2017-01-01 RX ADMIN — DONEPEZIL HYDROCHLORIDE 5 MG: 10 TABLET, FILM COATED ORAL at 22:01

## 2017-01-01 RX ADMIN — LORAZEPAM 0.5 MG: 2 INJECTION INTRAMUSCULAR; INTRAVENOUS at 21:48

## 2017-01-01 RX ADMIN — HALOPERIDOL LACTATE 0.5 MG: 5 INJECTION, SOLUTION INTRAMUSCULAR at 20:06

## 2017-01-01 RX ADMIN — Medication 250 MG: at 09:11

## 2017-01-01 RX ADMIN — MORPHINE SULFATE 1 MG: 2 INJECTION, SOLUTION INTRAMUSCULAR; INTRAVENOUS at 23:33

## 2017-01-01 RX ADMIN — MORPHINE SULFATE 1 MG: 2 INJECTION, SOLUTION INTRAMUSCULAR; INTRAVENOUS at 17:32

## 2017-01-01 RX ADMIN — FAMOTIDINE 20 MG: 20 TABLET ORAL at 08:56

## 2017-01-01 RX ADMIN — SODIUM CHLORIDE 75 ML/HR: 9 INJECTION, SOLUTION INTRAVENOUS at 21:04

## 2017-01-01 RX ADMIN — HALOPERIDOL LACTATE 0.5 MG: 5 INJECTION, SOLUTION INTRAMUSCULAR at 23:16

## 2017-01-01 RX ADMIN — Medication 250 MG: at 10:10

## 2017-01-01 RX ADMIN — FINASTERIDE 5 MG: 5 TABLET, FILM COATED ORAL at 08:56

## 2017-01-01 RX ADMIN — TAMSULOSIN HYDROCHLORIDE 0.4 MG: 0.4 CAPSULE ORAL at 10:10

## 2017-01-01 RX ADMIN — ENOXAPARIN SODIUM 40 MG: 40 INJECTION SUBCUTANEOUS at 08:51

## 2017-01-01 RX ADMIN — TEMAZEPAM 7.5 MG: 7.5 CAPSULE ORAL at 22:01

## 2017-01-01 RX ADMIN — ASPIRIN 81 MG CHEWABLE TABLET 81 MG: 81 TABLET CHEWABLE at 08:51

## 2017-01-01 RX ADMIN — MICONAZOLE NITRATE: 2 CREAM TOPICAL at 08:52

## 2017-01-01 RX ADMIN — HYDROCODONE BITARTRATE AND ACETAMINOPHEN 1 TABLET: 5; 325 TABLET ORAL at 22:01

## 2017-01-01 RX ADMIN — LORAZEPAM 0.25 MG: 2 INJECTION INTRAMUSCULAR; INTRAVENOUS at 21:04

## 2017-01-01 RX ADMIN — BISACODYL 10 MG: 10 SUPPOSITORY RECTAL at 20:19

## 2017-01-01 RX ADMIN — LORAZEPAM 0.5 MG: 2 INJECTION INTRAMUSCULAR; INTRAVENOUS at 12:50

## 2017-01-01 RX ADMIN — HYDROCODONE BITARTRATE AND ACETAMINOPHEN 1 TABLET: 5; 325 TABLET ORAL at 20:48

## 2017-01-01 RX ADMIN — POLYVINYL ALCOHOL 2 DROP: 14 SOLUTION/ DROPS OPHTHALMIC at 08:43

## 2017-01-01 RX ADMIN — MEROPENEM 1 G: 1 INJECTION, POWDER, FOR SOLUTION INTRAVENOUS at 16:05

## 2017-01-01 RX ADMIN — MICONAZOLE NITRATE: 2 CREAM TOPICAL at 14:04

## 2017-01-01 RX ADMIN — MORPHINE SULFATE 1 MG: 2 INJECTION, SOLUTION INTRAMUSCULAR; INTRAVENOUS at 06:12

## 2017-01-01 RX ADMIN — DONEPEZIL HYDROCHLORIDE 5 MG: 10 TABLET, FILM COATED ORAL at 21:28

## 2017-01-01 RX ADMIN — FINASTERIDE 5 MG: 5 TABLET, FILM COATED ORAL at 09:11

## 2017-01-01 RX ADMIN — HALOPERIDOL LACTATE 0.5 MG: 5 INJECTION, SOLUTION INTRAMUSCULAR at 02:44

## 2017-01-01 RX ADMIN — LORAZEPAM 0.25 MG: 2 INJECTION INTRAMUSCULAR; INTRAVENOUS at 15:43

## 2017-01-01 RX ADMIN — MORPHINE SULFATE 1 MG: 2 INJECTION, SOLUTION INTRAMUSCULAR; INTRAVENOUS at 06:08

## 2017-01-01 RX ADMIN — Medication: at 17:40

## 2017-01-01 RX ADMIN — LORAZEPAM 0.25 MG: 2 INJECTION INTRAMUSCULAR; INTRAVENOUS at 12:23

## 2017-01-01 RX ADMIN — ASPIRIN 81 MG CHEWABLE TABLET 81 MG: 81 TABLET CHEWABLE at 09:14

## 2017-01-01 RX ADMIN — MORPHINE SULFATE 1 MG: 2 INJECTION, SOLUTION INTRAMUSCULAR; INTRAVENOUS at 08:48

## 2017-01-01 RX ADMIN — FINASTERIDE 5 MG: 5 TABLET, FILM COATED ORAL at 08:48

## 2017-01-01 RX ADMIN — LEVOTHYROXINE SODIUM 75 MCG: 75 TABLET ORAL at 06:15

## 2017-01-01 RX ADMIN — MEROPENEM 1 G: 1 INJECTION, POWDER, FOR SOLUTION INTRAVENOUS at 18:31

## 2017-01-01 RX ADMIN — HALOPERIDOL LACTATE 1 MG: 5 INJECTION, SOLUTION INTRAMUSCULAR at 08:56

## 2017-01-01 RX ADMIN — POLYETHYLENE GLYCOL 3350 17 G: 17 POWDER, FOR SOLUTION ORAL at 10:11

## 2017-01-01 RX ADMIN — DAPTOMYCIN 350 MG: 500 INJECTION, POWDER, LYOPHILIZED, FOR SOLUTION INTRAVENOUS at 14:04

## 2017-01-01 RX ADMIN — TAMSULOSIN HYDROCHLORIDE 0.4 MG: 0.4 CAPSULE ORAL at 09:11

## 2017-01-01 RX ADMIN — FINASTERIDE 5 MG: 5 TABLET, FILM COATED ORAL at 10:10

## 2017-01-01 RX ADMIN — BISACODYL 10 MG: 10 SUPPOSITORY RECTAL at 08:55

## 2017-01-01 RX ADMIN — FAMOTIDINE 20 MG: 20 TABLET ORAL at 08:52

## 2017-01-01 RX ADMIN — HYDROCODONE BITARTRATE AND ACETAMINOPHEN 1 TABLET: 5; 325 TABLET ORAL at 21:02

## 2017-01-01 RX ADMIN — SODIUM CHLORIDE 100 ML/HR: 9 INJECTION, SOLUTION INTRAVENOUS at 18:41

## 2017-01-01 RX ADMIN — Medication 250 MG: at 18:31

## 2017-01-01 RX ADMIN — HALOPERIDOL LACTATE 1 MG: 5 INJECTION, SOLUTION INTRAMUSCULAR at 14:27

## 2017-01-01 RX ADMIN — TEMAZEPAM 7.5 MG: 7.5 CAPSULE ORAL at 21:03

## 2017-01-01 RX ADMIN — MORPHINE SULFATE 1 MG: 2 INJECTION, SOLUTION INTRAMUSCULAR; INTRAVENOUS at 06:48

## 2017-01-01 RX ADMIN — SODIUM CHLORIDE 100 ML/HR: 9 INJECTION, SOLUTION INTRAVENOUS at 08:29

## 2017-01-01 RX ADMIN — HALOPERIDOL LACTATE 0.5 MG: 5 INJECTION, SOLUTION INTRAMUSCULAR at 09:51

## 2017-01-01 RX ADMIN — MORPHINE SULFATE 1 MG: 2 INJECTION, SOLUTION INTRAMUSCULAR; INTRAVENOUS at 12:07

## 2017-01-01 RX ADMIN — SODIUM CHLORIDE 1000 ML: 9 INJECTION, SOLUTION INTRAVENOUS at 15:47

## 2017-01-01 RX ADMIN — FAMOTIDINE 20 MG: 20 TABLET ORAL at 10:10

## 2017-01-01 RX ADMIN — TEMAZEPAM 7.5 MG: 7.5 CAPSULE ORAL at 21:36

## 2017-01-01 RX ADMIN — MICONAZOLE NITRATE: 2 CREAM TOPICAL at 21:03

## 2017-01-01 RX ADMIN — TEMAZEPAM 7.5 MG: 7.5 CAPSULE ORAL at 20:48

## 2017-01-01 RX ADMIN — HALOPERIDOL LACTATE 1 MG: 5 INJECTION, SOLUTION INTRAMUSCULAR at 01:11

## 2017-01-01 RX ADMIN — HALOPERIDOL LACTATE 0.5 MG: 5 INJECTION, SOLUTION INTRAMUSCULAR at 05:38

## 2017-01-01 RX ADMIN — ERTAPENEM SODIUM 1 G: 1 INJECTION, POWDER, LYOPHILIZED, FOR SOLUTION INTRAMUSCULAR; INTRAVENOUS at 17:11

## 2017-01-01 RX ADMIN — ENOXAPARIN SODIUM 40 MG: 40 INJECTION SUBCUTANEOUS at 10:10

## 2017-01-01 RX ADMIN — MICONAZOLE NITRATE: 2 CREAM TOPICAL at 09:10

## 2017-01-01 RX ADMIN — MEROPENEM 1 G: 1 INJECTION, POWDER, FOR SOLUTION INTRAVENOUS at 06:12

## 2017-01-01 RX ADMIN — MORPHINE SULFATE 1 MG: 2 INJECTION, SOLUTION INTRAMUSCULAR; INTRAVENOUS at 17:05

## 2017-01-01 RX ADMIN — TAMSULOSIN HYDROCHLORIDE 0.4 MG: 0.4 CAPSULE ORAL at 08:52

## 2017-01-01 RX ADMIN — HALOPERIDOL LACTATE 1 MG: 5 INJECTION, SOLUTION INTRAMUSCULAR at 14:12

## 2017-01-01 RX ADMIN — HALOPERIDOL LACTATE 0.5 MG: 5 INJECTION, SOLUTION INTRAMUSCULAR at 18:45

## 2017-01-01 RX ADMIN — HALOPERIDOL LACTATE 1 MG: 5 INJECTION, SOLUTION INTRAMUSCULAR at 08:22

## 2017-01-01 RX ADMIN — Medication 250 MG: at 08:56

## 2017-01-01 RX ADMIN — MORPHINE SULFATE 1 MG: 2 INJECTION, SOLUTION INTRAMUSCULAR; INTRAVENOUS at 11:59

## 2017-01-01 RX ADMIN — TAMSULOSIN HYDROCHLORIDE 0.4 MG: 0.4 CAPSULE ORAL at 08:56

## 2017-01-01 RX ADMIN — DOCUSATE SODIUM 100 MG: 100 CAPSULE, LIQUID FILLED ORAL at 18:41

## 2017-01-01 RX ADMIN — MORPHINE SULFATE 1 MG: 2 INJECTION, SOLUTION INTRAMUSCULAR; INTRAVENOUS at 19:48

## 2017-01-01 RX ADMIN — HALOPERIDOL LACTATE 1 MG: 5 INJECTION, SOLUTION INTRAMUSCULAR at 15:09

## 2017-01-01 RX ADMIN — DOCUSATE SODIUM 100 MG: 100 CAPSULE, LIQUID FILLED ORAL at 10:10

## 2017-01-01 RX ADMIN — SODIUM CHLORIDE 1000 ML: 9 INJECTION, SOLUTION INTRAVENOUS at 13:38

## 2017-07-14 PROBLEM — E03.9 HYPOTHYROID: Status: ACTIVE | Noted: 2017-01-01

## 2017-07-14 PROBLEM — N39.0 UTI (URINARY TRACT INFECTION): Status: ACTIVE | Noted: 2017-01-01

## 2017-07-14 PROBLEM — N39.0 ACUTE UTI: Status: ACTIVE | Noted: 2017-01-01

## 2017-07-15 PROBLEM — R78.81 ENTEROCOCCAL BACTEREMIA: Status: ACTIVE | Noted: 2017-01-01

## 2017-07-15 PROBLEM — D61.818 PANCYTOPENIA (HCC): Status: ACTIVE | Noted: 2017-01-01

## 2017-07-15 PROBLEM — B95.2 ENTEROCOCCAL BACTEREMIA: Status: ACTIVE | Noted: 2017-01-01

## 2017-07-19 PROBLEM — E87.20 LACTIC ACIDOSIS: Status: ACTIVE | Noted: 2017-01-01

## 2017-07-19 NOTE — H&P
"Hospice History and Physical     Patient Name:  Nestor Laguerre   : 10/1/1929   Sex: male    Patient Care Team:  Braydon Tejeda MD as PCP - General (Family Medicine)    Code Status: comfort measures    Subjective     Patient is a 87 y.o. male who presented with PMHx significant for coronary artery disease, atrial fibrillation, and dementia. Patient also had prostate surgery, which was complicated by enterococcal bacteremia, discitis, and epidural abscess. Patient recently diagnosed with discitis and followed by Infectious Disease. Upon presentation in the Emergency Department, patient had UTI, significant pancytopenia, and lactic acidemia. Pt admitted to hospital 17.     During hospitalization pt received IV abx, blood transfusion, was restless/agitated, poor po intake, and unfortunately showed no clinically improvement. Family opted for comfort measures and pt was admitted to in Hospice on 17.     Review of Systems  Review of Systems   Unable to perform ROS: Patient nonverbal     History  Past Medical History:   Diagnosis Date   • Acute kidney injury    • Alzheimer's dementia    • Atrial fibrillation    • Blood infection 2017   • BPH (benign prostatic hyperplasia)    • Coronary artery disease    • Dementia    • Discitis    • Hypothyroid    • Rheumatic disease     heart     Past Surgical History:   Procedure Laterality Date   • BACK SURGERY  2017    Osbaldo Garcia MD   • CHOLECYSTECTOMY     • CORONARY ANGIOPLASTY WITH STENT PLACEMENT     • HERNIA REPAIR     • PROSTATE SURGERY      \"bored out\" his prostate so he could urinate Dr. Galindo   • TOTAL HIP ARTHROPLASTY Right      Current Facility-Administered Medications   Medication Dose Route Frequency Provider Last Rate Last Dose   • glycopyrrolate (ROBINUL) injection 0.2 mg  0.2 mg Intravenous BID PRN MOE Gutierrez       • haloperidol lactate (HALDOL) injection 0.5 mg  0.5 mg Intravenous Q6H MOE Gutierrez   0.5 mg at 17 1422 "   • LORazepam (ATIVAN) injection 0.5 mg  0.5 mg Intravenous Q4H PRN Violetta Clement, APRN   0.5 mg at 07/19/17 1250   • Morphine sulfate (PF) injection 1 mg  1 mg Intravenous Q6H Violetta Clement, APRN   1 mg at 07/19/17 1207   • Morphine sulfate (PF) injection 2 mg  2 mg Intravenous Q1H PRN Violetta Clement, APRN       • ondansetron (ZOFRAN) injection 4 mg  4 mg Intravenous Q6H PRN Violetta Clement, APRN       • palliative care oral rinse   Mouth/Throat BID Violetta Clement, APRN            •  glycopyrrolate  •  LORazepam  •  Morphine  •  ondansetron  Allergies   Allergen Reactions   • Penicillins Hives and Swelling     Family History   Problem Relation Age of Onset   • Heart disease Mother    • Heart disease Father      Social History     Social History   • Marital status: Unknown     Spouse name: N/A   • Number of children: N/A   • Years of education: N/A     Occupational History   • Not on file.     Social History Main Topics   • Smoking status: Never Smoker   • Smokeless tobacco: Not on file      Comment: tobacco chewer   • Alcohol use No   • Drug use: No   • Sexual activity: Defer     Other Topics Concern   • Not on file     Social History Narrative    Has been at [facility] since 6/27 for rehab.  Lives with his daughter normally.         Objective     Vital Signs  Temp:  [97.6 °F (36.4 °C)] 97.6 °F (36.4 °C)  Heart Rate:  [63-74] 63  Resp:  [18] 18  BP: (100)/(48) 100/48    PPS: Palliative Performance Scale score as of 7/19/2017, 2:49 PM is 20% based on the following measures:   Ambulation: Totally bed bound  Activity and Evidence of Disease: Unable to do any work, extensive evidence of disease  Self-Care: Total care  Intake:Minimal sips  LOC: Full, drowsy or confusion    Physical Exam:  Physical Exam   Constitutional: He appears listless. He appears cachectic. He has a sickly appearance. He appears ill. No distress.   HENT:   + temporal wasting   Eyes:   Eyes remained closed   Neck: No tracheal deviation  present.   Cardiovascular: Normal rate.  Exam reveals no friction rub.    No murmur heard.  Pulmonary/Chest: Effort normal. No respiratory distress. He has no wheezes.   Abdominal: Soft. He exhibits no distension. There is no tenderness.   Musculoskeletal: He exhibits no edema or deformity.   + muscle wasting   Neurological: He appears listless.   Does not follow commands   Skin: He is not diaphoretic. There is pallor.   Psychiatric:   Unable to evaluate       Results Review:   No results found for: HGBA1C    Lab Results   Component Value Date    GLUCOSE 76 07/17/2017    BUN 27 (H) 07/17/2017    CREATININE 1.20 07/17/2017    EGFRIFNONA 57 (L) 07/17/2017    BCR 22.5 07/17/2017    K 4.2 07/17/2017    CO2 21.0 07/17/2017    CALCIUM 9.3 07/17/2017    ALBUMIN 2.70 (L) 07/15/2017    LABIL2 1.1 (L) 07/15/2017    AST 15 07/15/2017    ALT 11 07/15/2017       WBC   Date Value Ref Range Status   07/18/2017 4.17 3.50 - 10.80 10*3/mm3 Final     RBC   Date Value Ref Range Status   07/18/2017 3.49 (L) 4.20 - 5.76 10*6/mm3 Final     Hemoglobin   Date Value Ref Range Status   07/18/2017 9.8 (L) 13.1 - 17.5 g/dL Final     Hematocrit   Date Value Ref Range Status   07/18/2017 29.9 (L) 38.9 - 50.9 % Final     MCV   Date Value Ref Range Status   07/18/2017 85.7 80.0 - 99.0 fL Final     MCH   Date Value Ref Range Status   07/18/2017 28.1 27.0 - 31.0 pg Final     MCHC   Date Value Ref Range Status   07/18/2017 32.8 32.0 - 36.0 g/dL Final     RDW   Date Value Ref Range Status   07/18/2017 17.4 (H) 11.3 - 14.5 % Final     RDW-SD   Date Value Ref Range Status   07/18/2017 54.2 (H) 37.0 - 54.0 fl Final     MPV   Date Value Ref Range Status   07/17/2017 10.6 6.0 - 12.0 fL Final     Platelets   Date Value Ref Range Status   07/18/2017 33 (L) 150 - 450 10*3/mm3 Final     Neutrophil %   Date Value Ref Range Status   07/18/2017 63.3 41.0 - 71.0 % Final     Lymphocyte %   Date Value Ref Range Status   07/18/2017 22.8 (L) 24.0 - 44.0 % Final      Monocyte %   Date Value Ref Range Status   07/18/2017 12.0 0.0 - 12.0 % Final     Eosinophil %   Date Value Ref Range Status   07/18/2017 1.7 0.0 - 3.0 % Final     Basophil %   Date Value Ref Range Status   07/18/2017 0.2 0.0 - 1.0 % Final     Immature Grans %   Date Value Ref Range Status   07/18/2017 0.0 0.0 - 0.6 % Final     Neutrophils, Absolute   Date Value Ref Range Status   07/18/2017 2.64 1.50 - 8.30 10*3/mm3 Final     Lymphocytes, Absolute   Date Value Ref Range Status   07/18/2017 0.95 0.60 - 4.80 10*3/mm3 Final     Monocytes, Absolute   Date Value Ref Range Status   07/18/2017 0.50 0.00 - 1.00 10*3/mm3 Final     Eosinophils, Absolute   Date Value Ref Range Status   07/18/2017 0.07 0.00 - 0.30 10*3/mm3 Final     Basophils, Absolute   Date Value Ref Range Status   07/18/2017 0.01 0.00 - 0.20 10*3/mm3 Final     Immature Grans, Absolute   Date Value Ref Range Status   07/18/2017 0.00 0.00 - 0.03 10*3/mm3 Final     nRBC   Date Value Ref Range Status   07/18/2017 0.0 0.0 - 0.0 /100 WBC Final       Principal Problem:    Lactic acidosis  Active Problems:     recent 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 Discussion about feeding pt at this time, advised decisions to be pt driven. Pt is not asking for food/water. Family comfort feeding, pt regurgitated bite(s) of food this morning per family. Will provide swabs to moisten mouth. Questions answered.     - continue scheduled haldol, morphine  - schedule palliative mouth rinse, eye gtts  - prns available  - continue to monitor and support pt/family    Total Visit Time: 55 minutes  Face to Face Time: 30 minutes    Justification for care:  Patient meets criteria for acute  in-patient care with required nursing assessment and interventions for symptoms with IV medications.    MOE Gutierrez  (C) [phone]  (O) [phone]  07/19/17  2:48 PM

## 2017-07-19 NOTE — PROGRESS NOTES
Adult Nutrition  Assessment/PES    Patient Name:  Nestor Laguerre  YOB: 1929  MRN: [id number]  Admit Date:  7/19/2017    Assessment Date:  7/19/2017        Reason for Assessment       07/19/17 1227    Reason for Assessment    Reason For Assessment/Visit follow up protocol    Time Spent (min) 20    Diagnosis Diagnosis   per notes this admission              Labs/Tests/Procedures/Meds       07/19/17 1230    Labs/Tests/Procedures/Meds    Labs/Tests Review Reviewed              Nutrition Prescription Ordered       07/19/17 1232    Nutrition Prescription PO    Current PO Diet --   no current diet order        Problem/Interventions:        Problem 2       07/19/17 1232    Nutrition Diagnoses Problem 2    Problem 2 No Nutrition Diagnosis at this Time              Intervention Goal       07/19/17 1233    Intervention Goal    General Hospice Care   patient admitted to inpatient hospice            Nutrition Intervention       07/19/17 1233    Nutrition Intervention    RD/Tech Action Follow Tx progress;Care plan reviewd              Education/Evaluation       07/19/17 1233    Monitor/Evaluation    Monitor Per protocol          Electronically signed by:  Olga Barrera  07/19/17 12:33 PM

## 2017-07-19 NOTE — PLAN OF CARE
Problem: Confusion, Acute (Adult)  Goal: Cognitive/Functional Impairments Minimized  Outcome: Ongoing (interventions implemented as appropriate)  Goal: Safety  Outcome: Ongoing (interventions implemented as appropriate)

## 2017-07-19 NOTE — PROGRESS NOTES
Continued Stay Note  [facility]     Patient Name: Nestor Laguerre  MRN: [id number]  Today's Date: 7/19/2017    Admit Date: 7/19/2017          Discharge Plan       07/19/17 1028    Case Management/Social Work Plan    Plan inpatient hospice admission    Additional Comments Chart reviewed.  Met with family to discuss inpatient hospice services. Co visit with hospice SW made.  Instructed on symptom management, inpatient hospice services, team member roles and routines, and contact numbers given to family and facility staff.  Dr. Delgadillo notified of transfer of services.  Patient is resting comfortably in bed.  Breathing is regular, even, and unlabored.  Face and body are relaxed.  Please call [phone] for assistance/concerns.              Discharge Codes     None            Jodie Weaver RN

## 2017-07-20 NOTE — PROGRESS NOTES
"Hospice Progress Note    Patient Name: Nestor Laguerre   : 10/1/1929  Sex: male    Code Status: comfort measures    Date of Admission: 2017    Subjective:    Pt laying in bed, talking today telling stories from \"way back\"    Pt does not answer questions    Family at bedside    No po intake, mouth moistened by sponges      ROS:  Review of Systems   Unable to perform ROS: Acuity of condition     Reviewed current scheduled and prn medications for route, type, dose and frequency.     •  glycopyrrolate  •  haloperidol lactate  •  LORazepam  •  Morphine  •  ondansetron    Objective:   /70 (BP Location: Left arm, Patient Position: Lying)  Pulse 63  Temp 97 °F (36.1 °C) (Axillary)   Resp 18  Ht 67\" (170.2 cm)  Wt 125 lb (56.7 kg)  SpO2 96%  BMI 19.58 kg/m2   Intake & Output (last day)       701 -  0700  07 -  0700          Unmeasured Urine Occurrence 4 x         Lab Results (last 24 hours)     ** No results found for the last 24 hours. **        Imaging Results (last 24 hours)     ** No results found for the last 24 hours. **          PPS: Palliative Performance Scale score as of 2017, 11:41 AM is 10% based on the following measures:   Ambulation: Totally bed bound  Activity and Evidence of Disease: Unable to do any work, extensive evidence of disease  Self-Care: Total care  Intake:  Mouth care only  LOC: Drowsy or coma      Physical Exam:  Physical Exam   Constitutional: No distress.   HENT:   + temporal wasting   Eyes:   Kept eyes closed   Neck: No tracheal deviation present.   Cardiovascular: Normal rate.    Faint radial pulse   Pulmonary/Chest: Effort normal. No respiratory distress.   Abdominal: Soft. He exhibits no distension. There is no tenderness.   Musculoskeletal: He exhibits no edema.   Neurological:   Rambling, eyes closed, moves his arms, NAD   Skin: Skin is dry. He is not diaphoretic. There is pallor.   Psychiatric: He has a normal mood and affect.   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 Pt agitated last night, picking at sheets, trying to get out of bed. PRNs not effective, haldol most effective out of all medications tried. Family provides moistened swaps for pt's mouth.     - d/c vitals  - supp if no BM in three days  - haldol increased to 1mg  - prns increased: robinul, haldol    Total Visit Time: 20 minutes  Face to Face Time: 10 minutes    Justification for care:  Patient meets criteria for acute in-patient care with required nursing assessment and interventions for symptoms with IV medications.      MOE Gutierrez  (C) [phone]  (O) [phone]  07/20/17  11:41 AM

## 2017-07-20 NOTE — PLAN OF CARE
Problem: Confusion, Acute (Adult)  Goal: Identify Related Risk Factors and Signs and Symptoms  Outcome: Ongoing (interventions implemented as appropriate)  Goal: Cognitive/Functional Impairments Minimized  Outcome: Ongoing (interventions implemented as appropriate)  Goal: Safety  Outcome: Ongoing (interventions implemented as appropriate)    Problem: Patient Care Overview (Adult)  Goal: Plan of Care Review  Outcome: Ongoing (interventions implemented as appropriate)  Goal: Discharge Needs Assessment  Outcome: Ongoing (interventions implemented as appropriate)    Problem: Dying Patient, Actively (Adult)  Goal: Identify Related Risk Factors and Signs and Symptoms  Outcome: Ongoing (interventions implemented as appropriate)  Goal: Comfort/Pain Control  Outcome: Ongoing (interventions implemented as appropriate)  Goal: Dying Process, Peace and Dignity  Outcome: Ongoing (interventions implemented as appropriate)

## 2017-07-20 NOTE — PLAN OF CARE
Problem: Confusion, Acute (Adult)  Goal: Identify Related Risk Factors and Signs and Symptoms  Outcome: Ongoing (interventions implemented as appropriate)  Goal: Cognitive/Functional Impairments Minimized  Outcome: Ongoing (interventions implemented as appropriate)  Goal: Safety  Outcome: Ongoing (interventions implemented as appropriate)    Problem: Patient Care Overview (Adult)  Goal: Plan of Care Review  Outcome: Ongoing (interventions implemented as appropriate)    07/20/17 0401   Coping/Psychosocial Response Interventions   Plan Of Care Reviewed With patient   Patient Care Overview   Progress declining   Outcome Evaluation   Outcome Summary/Follow up Plan pt did not open eyes and respond verbally, no s/s of discomfort noted during shift       Goal: Adult Individualization and Mutuality  Outcome: Ongoing (interventions implemented as appropriate)

## 2017-07-20 NOTE — PROGRESS NOTES
Continued Stay Note  [facility]     Patient Name: Nestor Laguerre  MRN: [id number]  Today's Date: 7/20/2017    Admit Date: 7/19/2017          Discharge Plan       07/20/17 1010    Case Management/Social Work Plan    Plan [facility] inpatient hospice    Additional Comments Chart reviewed.  Pt lying with eyes closed.  Opens eyes to name and can answer questions appropriately.  NurseMartha at bedside giving scheduled meds.  Dtrs at bedside.  Pt continues to require general inpatient hospice care for skilled nursing assessment and interventions for pain and agitation with scheduled and prn IV med administration and monitoring for effectiveness of these meds.  If hospice team can be of further assist, call ext [phone].               Discharge Codes     None            Lisa Fernandez RN

## 2017-07-21 NOTE — PROGRESS NOTES
"Hospice Progress Note     Patient Name: Nestor Laguerre   : 10/1/1929  Sex: male     Code Status: comfort measures     Date of Admission: 2017    Subjective:    Pt alert, not talking as much today, but mumbling. Wants water; family moistening mouth with swabs.    Restful night.     Lots of family at bedside.     Daughter states \"that one medicine works real well. As soon as he gets that he comes down there real quick.\" -- spoke with nurse medicine daughter is referring to is Ativan.      ROS:  Review of Systems   Unable to perform ROS: Acuity of condition   Constitutional:        Pt does not answer questions when asked. Did request water.        Reviewed current scheduled and prn medications for route, type, dose and frequency.     glycopyrrolate  •  haloperidol lactate  •  LORazepam  •  Morphine  •  ondansetron    Objective:   /81 (BP Location: Left arm, Patient Position: Lying)  Pulse 63  Temp 96.8 °F (36 °C) (Axillary)   Resp 18  Ht 67\" (170.2 cm)  Wt 125 lb (56.7 kg)  SpO2 96%  BMI 19.58 kg/m2   Intake & Output (last day)        0701 -  0700  07 -  0700    Urine (mL/kg/hr) 300 (0.2)     Total Output 300      Net -300            Unmeasured Urine Occurrence 1 x         Lab Results (last 24 hours)     ** No results found for the last 24 hours. **        Imaging Results (last 24 hours)     ** No results found for the last 24 hours. **          PPS: Palliative Performance Scale score as of 2017, 2:53 PM is 20% based on the following measures:   Ambulation: Totally bed bound  Activity and Evidence of Disease: Unable to do any work, extensive evidence of disease  Self-Care: Total care  Intake:Minimal sips  LOC: Full, drowsy or confusion    Physical Exam:  Physical Exam   Constitutional: He appears cachectic. No distress.   HENT:   + temporal wasting   Eyes: Right eye exhibits no discharge. Left eye exhibits no discharge.   Open at times but does not track movement often "   Neck: No JVD present. No tracheal deviation present.   Cardiovascular: Exam reveals no gallop and no friction rub.    Pedal, radial pulses absent   Pulmonary/Chest: Effort normal and breath sounds normal. No respiratory distress.   Abdominal: Soft. Bowel sounds are normal. He exhibits no distension. There is no tenderness.   Musculoskeletal: He exhibits no edema.   Neurological: He is alert.   Skin: Skin is dry. He is not diaphoretic. There is pallor.   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 There are lots of family in the room (lots of environmental stimulation today). Per nurse and family, Ativan works better for pt than Haldol. No change seen in pt with scheduled Haldol, even with dose increase. Pt does have hx hallucinations/dementia. Discussed multiple topics ranging from plan of care and s/s of dying process. Brother from [place] will stay with pt tonight. Family all very supportive of each other; complimentary of staff.     - schedule ativan low dose BID at 2100 and 1200 - times most beneficial to pt the past few days  - maintain prn dose ativan  - supp for BM  - change to prn: eye gtts, mouth rinse (pt/family preference)    Total Visit Time: 60 minutes  Face to Face Time: 45 minutes    Justification for care:  Patient meets criteria for acute in-patient care with required nursing assessment and interventions for symptoms with IV medications.      Violetta Clement, APRN  (C) [phone]  (O) [phone]  07/21/17  2:39 PM

## 2017-07-21 NOTE — PLAN OF CARE
Problem: Patient Care Overview (Adult)  Goal: Plan of Care Review  Outcome: Ongoing (interventions implemented as appropriate)  Goal: Adult Individualization and Mutuality  Outcome: Ongoing (interventions implemented as appropriate)  Goal: Discharge Needs Assessment  Outcome: Unable to achieve outcome(s) by discharge Date Met:  07/21/17    Problem: Dying Patient, Actively (Adult)  Goal: Identify Related Risk Factors and Signs and Symptoms  Outcome: Ongoing (interventions implemented as appropriate)  Goal: Comfort/Pain Control  Outcome: Ongoing (interventions implemented as appropriate)  Goal: Dying Process, Peace and Dignity  Outcome: Ongoing (interventions implemented as appropriate)

## 2017-07-21 NOTE — PLAN OF CARE
Problem: Confusion, Acute (Adult)  Goal: Identify Related Risk Factors and Signs and Symptoms  Outcome: Ongoing (interventions implemented as appropriate)  Goal: Cognitive/Functional Impairments Minimized  Outcome: Ongoing (interventions implemented as appropriate)  Goal: Safety  Outcome: Ongoing (interventions implemented as appropriate)    Problem: Patient Care Overview (Adult)  Goal: Plan of Care Review  Outcome: Ongoing (interventions implemented as appropriate)    07/21/17 0236   Outcome Evaluation   Outcome Summary/Follow up Plan PT appeared to rest well, did not require any PRNs except Ativan once before placing hsu. Continue current POC.        Goal: Adult Individualization and Mutuality  Outcome: Ongoing (interventions implemented as appropriate)  Goal: Discharge Needs Assessment  Outcome: Ongoing (interventions implemented as appropriate)    Problem: Dying Patient, Actively (Adult)  Goal: Identify Related Risk Factors and Signs and Symptoms  Outcome: Ongoing (interventions implemented as appropriate)  Goal: Comfort/Pain Control  Outcome: Ongoing (interventions implemented as appropriate)  Goal: Dying Process, Peace and Dignity  Outcome: Ongoing (interventions implemented as appropriate)

## 2017-07-21 NOTE — PROGRESS NOTES
Continued Stay Note  [facility]     Patient Name: Nestor Laguerre  MRN: [id number]  Today's Date: 7/21/2017    Admit Date: 7/19/2017          Discharge Plan       07/21/17 1401    Case Management/Social Work Plan    Plan [facility] inpatient hospice    Additional Comments Chart reviewed.  Pt resting with eyes closed.  Will mumble at times.  Dtrs at bedside.  State pt had been moaning earlier and after receiving scheduled Morphine had settled and was comfortable.  Pt continues to require general inpatient hospice care for skilled nursing assessment and interventions for symtpom mgmt of pain, agitation, and restlessness with scheduled and prn IV med administration and monitoring for effectiveness of these meds.  If hospice team can be of further assist, call ext [phone].               Discharge Codes     None            Lisa Fernandez RN

## 2017-07-22 NOTE — PROGRESS NOTES
Continued Stay Note  [facility]     Patient Name: Nestor Laguerre  MRN: [id number]  Today's Date: 7/22/2017    Admit Date: 7/19/2017          Discharge Plan       07/22/17 1000    Case Management/Social Work Plan    Plan inpatient hospice note    Additional Comments Chart reviewed. Patient resting in bed with eyes closed.  Family at bedside reports that patient has not been awake this am.  Patient appears comfortable.  Face and body are relaxed.  Breathing is regular, even, and unlabored.  Family to shave patient today.  Family reports that patient does not like facial hair and that it seems to be bothering him.  Education provided regarding disease progression.  Family at bedside verbalized an understanding.  Patient remains inpatient appropriate due to requiring skilled nursing assessments and interventions for agitation including IV/SC medications to promote comfort.  Please call [phone] for hospice RN.  Hospice RN is available daily 8-5.                Discharge Codes     None            Jodie Weaver RN

## 2017-07-22 NOTE — PLAN OF CARE
Problem: Patient Care Overview (Adult)  Goal: Plan of Care Review  Outcome: Ongoing (interventions implemented as appropriate)  Goal: Adult Individualization and Mutuality  Outcome: Ongoing (interventions implemented as appropriate)    Problem: Dying Patient, Actively (Adult)  Goal: Identify Related Risk Factors and Signs and Symptoms  Outcome: Ongoing (interventions implemented as appropriate)  Goal: Comfort/Pain Control  Outcome: Ongoing (interventions implemented as appropriate)  Goal: Dying Process, Peace and Dignity  Outcome: Ongoing (interventions implemented as appropriate)

## 2017-07-22 NOTE — PROGRESS NOTES
Hospice Progress Note    Code Status: Comfort Measures and Allow Natural Death (A-N-D)     Subjective   S: Medical record reviewed, follow up visit for sx mgmt. Events noted. 2 dtrs and grandchildren at bedside. Report good control of sxs. He is on scheduled haldol, ativan and morphine. He did not require any PRNs yesterday.     ROS:   Negative except as above.    PMH/PSH/PFH: Reviewed, no changes    Allergies   Allergen Reactions   • Penicillins Hives and Swelling       Current Facility-Administered Medications   Medication Dose Route Frequency Provider Last Rate Last Dose   • bisacodyl (DULCOLAX) suppository 10 mg  10 mg Rectal Daily Violetta Clement APRN   10 mg at 07/20/17 2019   • glycopyrrolate (ROBINUL) injection 0.4 mg  0.4 mg Intravenous Q4H PRN Malini Delarosa MD       • haloperidol lactate (HALDOL) injection 1 mg  1 mg Intravenous Q6H Malini Delarosa MD   1 mg at 07/22/17 1316   • haloperidol lactate (HALDOL) injection 2 mg  2 mg Intravenous Q4H PRN Malini Delarosa MD       • LORazepam (ATIVAN) injection 0.25 mg  0.25 mg Intravenous BID Violetta Clement APRN   0.25 mg at 07/22/17 1223   • LORazepam (ATIVAN) injection 0.5 mg  0.5 mg Intravenous Q4H PRN Violetta Clement APRN   0.5 mg at 07/20/17 2148   • Morphine sulfate (PF) injection 1 mg  1 mg Intravenous Q6H Violetta Clement APRN   1 mg at 07/22/17 1732   • Morphine sulfate (PF) injection 2 mg  2 mg Intravenous Q1H PRN Violetta Clement APRN       • ondansetron (ZOFRAN) injection 4 mg  4 mg Intravenous Q6H PRN Violetta Clement APRN       • palliative care oral rinse   Mouth/Throat PRN Violetta Clement APRN       • polyvinyl alcohol (LIQUIFILM) 1.4 % ophthalmic solution 2 drop  2 drop Both Eyes BID PRN MOE Gutierrez         Infusions:       Current medication reviewed for route, type, dose and frequency and are current per MAR at time of dictation.    Objective   /80 (BP Location: Left arm, Patient Position: Lying)  Pulse 63   "Temp 97.1 °F (36.2 °C) (Axillary)   Resp 18  Ht 67\" (170.2 cm)  Wt 125 lb (56.7 kg)  SpO2 96%  BMI 19.58 kg/m2    Intake/Output Summary (Last 24 hours) at 17 1833  Last data filed at 17 1728   Gross per 24 hour   Intake                0 ml   Output              800 ml   Net             -800 ml       PPS: 20%  Physical Examination:   General Appearance:    Chronically ill appearing, awakens easily, cachectic, NAD   HEENT:    NC/AT, without obvious abnormality, EOMI, anicteric    Neck:   supple, trachea midline, no JVD   Lungs:     Decreased BS but otherwise CTAB without w/r/r    Heart:    RRR, normal S1 and S2, no M/R/G   Abdomen:     Soft, NT, ND, NABS    Extremities:   Moves all extremities, no edema   Pulses:   Pulses palpable and equal bilaterally   Skin:   Warm, dry   Neurologic:   Alert, verbalizes but mostly nonsensical, cooperative   Psych:   Calm, appropriate         Labs/Diagnostics/Clinical Data: Reviewed.    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 yo male with lactic acidosis    Goals of Care: To remain comfortable until death  Family/Support/Spiritual: Family at bedside seems to be coping well. Report that they would rather him be sedated because they know that he is comfortable then, than to have him awake and agitated. They often go along with whatever he is babbling. They do report that tomorrow is the day their brother  12 yrs ago.      Plan:     Continue current regimen. No PRNs required yesterday.   Family denies need for adjustments.     Justification: Patient continues to meet criteria for Acute In-patient " Care due to ongoing sx mgmt using scheduled IV medication.    Time: 15 mins    Malini Delarosa MD  7/22/2017

## 2017-07-23 NOTE — PLAN OF CARE
Problem: Dying Patient, Actively (Adult)  Goal: Identify Related Risk Factors and Signs and Symptoms  Outcome: Ongoing (interventions implemented as appropriate)    07/21/17 1738 07/23/17 0232   Dying Patient, Actively   Actively Dying Patient: Related Risk Factors --  age/developmental level;level of consciousness   Signs and Symptoms (Actively Dying Patient) awareness reduced;changes in bladder elimination pattern;changes in bowel elimination pattern;total dependency;frequently disoriented;drowsiness for extended period;food/fluid disinterest/withdrawal;swallowing difficulty;profound weakness --        Goal: Comfort/Pain Control  Outcome: Ongoing (interventions implemented as appropriate)    07/23/17 0232   Dying Patient, Actively (Adult)   Comfort/Pain Control making progress toward outcome       Goal: Dying Process, Peace and Dignity  Outcome: Ongoing (interventions implemented as appropriate)    07/23/17 0232   Dying Patient, Actively (Adult)   Dying Process, Peace and Dignity making progress toward outcome

## 2017-07-23 NOTE — PROGRESS NOTES
Continued Stay Note   [place]     Patient Name: Nestor Laguerre  MRN: [id number]  Today's Date: 7/23/2017    Admit Date: 7/19/2017          Discharge Plan       07/23/17 1720    Case Management/Social Work Plan    Plan inpatient hospice note    Additional Comments chart reviewed. Patient unresponsive.  Appears comfortable.  Face and body are relaxed.  Breathing is regular, even, and unlabored.  Family in family room.  Updated on assessment finding and reviewed plan of care.  Please call [phone] for any issues/concerns/family requests.              Discharge Codes     None            Jodie Weaver RN

## 2017-07-23 NOTE — PROGRESS NOTES
"Hospice Progress Note    Code Status: Comfort Measures and Allow Natural Death (A-N-D)     Subjective   S: Medical record reviewed, follow up visit for sx mgmt. Events noted. Multiple family members at bedside. They report he has not been responsive since about 2100 last night when he told his 2 dtrs that he had \"one last milk cow to sell.\" They report feeling that he is comfortable. Note that a dose of haldol and morphine were held overnight- family reports that nightshift RN stated that there was no need for it if he was sleeping.     ROS:   Negative except as above.    PMH/PSH/PFH: Reviewed, no changes    Allergies   Allergen Reactions   • Penicillins Hives and Swelling       Current Facility-Administered Medications   Medication Dose Route Frequency Provider Last Rate Last Dose   • glycopyrrolate (ROBINUL) injection 0.4 mg  0.4 mg Intravenous Q4H PRN Malini Delarosa MD       • haloperidol lactate (HALDOL) injection 1 mg  1 mg Intravenous Q6H Malini Delarosa MD   1 mg at 07/23/17 1509   • haloperidol lactate (HALDOL) injection 2 mg  2 mg Intravenous Q4H PRN Malini Delarosa MD       • LORazepam (ATIVAN) injection 0.25 mg  0.25 mg Intravenous BID Violetta Clement, APRN   0.25 mg at 07/23/17 1225   • LORazepam (ATIVAN) injection 0.5 mg  0.5 mg Intravenous Q4H PRN Violetta Clement APRN   0.5 mg at 07/20/17 2148   • Morphine sulfate (PF) injection 1 mg  1 mg Intravenous Q6H Violetta Clement, APRN   1 mg at 07/23/17 1224   • Morphine sulfate (PF) injection 2 mg  2 mg Intravenous Q1H PRN Violetta Clement, APRN       • ondansetron (ZOFRAN) injection 4 mg  4 mg Intravenous Q6H PRN Violetta Clement APRN       • palliative care oral rinse   Mouth/Throat PRN Violetta Clement APRN       • polyvinyl alcohol (LIQUIFILM) 1.4 % ophthalmic solution 2 drop  2 drop Both Eyes BID PRN MOE Gutierrez         Infusions:       Current medication reviewed for route, type, dose and frequency and are current per MAR at time " "of dictation.    Objective   /80 (BP Location: Left arm, Patient Position: Lying)  Pulse 63  Temp 97.1 °F (36.2 °C) (Axillary)   Resp 18  Ht 67\" (170.2 cm)  Wt 125 lb (56.7 kg)  SpO2 96%  BMI 19.58 kg/m2    Intake/Output Summary (Last 24 hours) at 17 1512  Last data filed at 17 1100   Gross per 24 hour   Intake                0 ml   Output              800 ml   Net             -800 ml       PPS: 10%  Physical Examination:   General Appearance:    Chronically ill appearing, unresponsive, cachectic, NAD   HEENT:    NC/AT, without obvious abnormality, eyes closed    Neck:   Neck hyperextended   Lungs:     Decreased BS but otherwise CTAB without w/r/r    Heart:    RRR, normal S1 and S2, no M/R/G   Abdomen:     Soft, NT, ND, NABS    Extremities:   Moves all extremities, no edema   Pulses:   Pulses palpable and equal bilaterally   Skin:   Warm, dry   Neurologic:   Unresponsive   Psych:   Calm, appropriate         Labs/Diagnostics/Clinical Data: Reviewed.    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 yo male with lactic acidosis    Goals of Care: To remain comfortable until death  Family/Support/Spiritual: Family at bedside seems to be coping well. Report that they would like to ensure his comfort. They do report that today is the day their brother  12 yrs ago.      Plan:     Continue current regimen. No PRNs required yesterday.   Family denies need for adjustments.     We discussed the importance of scheduled meds and why we recommend keeping meds scheduled even if pt is unresponsive or sleeping- to avoid loss of control over " sxs. I recommended that if they encounter resistance from staff regarding providing meds on time that they should have them notify hospice first.     RN reports no BM in >5 days. Dulcolax order has - reordered for comfort.     Justification: Patient continues to meet criteria for Acute In-patient Care due to ongoing sx mgmt using scheduled IV medication.    Time: 15 mins    Malini Delarosa MD  2017

## 2017-07-24 NOTE — PLAN OF CARE
Problem: Dying Patient, Actively (Adult)  Intervention: Promote Gastrointestinal Comfort/Function    07/23/17 0800   Genitourinary () Interventions   Urinary Elimination Promotion catheter patency maintained         Goal: Identify Related Risk Factors and Signs and Symptoms  Outcome: Ongoing (interventions implemented as appropriate)    07/23/17 1809   Dying Patient, Actively   Actively Dying Patient: Related Risk Factors age/developmental level;level of consciousness   Signs and Symptoms (Actively Dying Patient) awareness reduced;total dependency;drowsiness for extended period;food/fluid disinterest/withdrawal;social contact/daily routine disinterest/withdrawal;profound weakness

## 2017-07-24 NOTE — SIGNIFICANT NOTE
Examination confirms with auscultation: zero audible heart tones and zero audible respirations. Mr.Amos Laguerre's death was pronounced 7/24/2017 at 1017.  Family and [place] present at bedside.  MD notified by Patient's RN.    Oskar Plasencia RN  Clinical House Supervisor  7/24/2017 10:44 AM

## 2017-07-24 NOTE — DISCHARGE SUMMARY
Date of Death:  7/24/2017  Time of Death:  1017    Presenting Problem/History of Present Illness  Principal Problem:    Lactic acidosis  Active Problems:     recent Enterococcal bacteremia with discitis and epidural abscess    Alzheimer's dementia    Acute kidney injury    Atrial fibrillation    BPH (benign prostatic hyperplasia)    Coronary artery disease    Discitis    UTI (urinary tract infection)    Hypothyroid    Pancytopenia    Hospital Course  Patient was a 87 y.o. male presented with PMHx significant for coronary artery disease, atrial fibrillation, and dementia. Patient also had prostate surgery, which was complicated by enterococcal bacteremia, discitis, and epidural abscess. Patient recently diagnosed with discitis and followed by Infectious Disease. Upon presentation in the Emergency Department, patient had UTI, significant pancytopenia, and lactic acidemia. Pt admitted to hospital 7/14/17.      During hospitalization pt received IV abx, blood transfusion, was restless/agitated, poor po intake, and unfortunately showed no clinically improvement. Family opted for comfort measures and pt was admitted to in Hospice on 7/19/17.     Consults:   Consults     Date and Time Order Name Status Description    7/18/2017 1415 Inpatient Consult to Palliative Care MD Completed     7/14/2017 1800 Inpatient Consult to Infectious Diseases Completed         Examination confirms with auscultation: zero audible heart tones and zero audible respirations. Mr.Amos Laguerre's death was pronounced 7/24/2017 at 1017.  Family and Nic present at bedside.  MD notified by Patient's RN.     Oskar Plasencia RN  Clinical House Supervisor  7/24/2017 10:44 AM    MOE Gutierrez  07/24/17  11:34 AM

## 2017-07-24 NOTE — PLAN OF CARE
Problem: Dying Patient, Actively (Adult)  Goal: Identify Related Risk Factors and Signs and Symptoms  Outcome: Ongoing (interventions implemented as appropriate)    07/23/17 1809   Dying Patient, Actively   Actively Dying Patient: Related Risk Factors age/developmental level;level of consciousness   Signs and Symptoms (Actively Dying Patient) awareness reduced;total dependency;drowsiness for extended period;food/fluid disinterest/withdrawal;social contact/daily routine disinterest/withdrawal;profound weakness       Goal: Comfort/Pain Control  Outcome: Ongoing (interventions implemented as appropriate)    07/24/17 0526   Dying Patient, Actively (Adult)   Comfort/Pain Control making progress toward outcome       Goal: Dying Process, Peace and Dignity  Outcome: Ongoing (interventions implemented as appropriate)    07/24/17 0526   Dying Patient, Actively (Adult)   Dying Process, Peace and Dignity making progress toward outcome